# Patient Record
Sex: MALE | Race: WHITE | NOT HISPANIC OR LATINO | Employment: FULL TIME | URBAN - NONMETROPOLITAN AREA
[De-identification: names, ages, dates, MRNs, and addresses within clinical notes are randomized per-mention and may not be internally consistent; named-entity substitution may affect disease eponyms.]

---

## 2017-04-13 ENCOUNTER — HISTORY (OUTPATIENT)
Dept: INTERNAL MEDICINE | Facility: OTHER | Age: 67
End: 2017-04-13

## 2017-04-13 ENCOUNTER — OFFICE VISIT - GICH (OUTPATIENT)
Dept: INTERNAL MEDICINE | Facility: OTHER | Age: 67
End: 2017-04-13

## 2017-04-13 DIAGNOSIS — L21.9 SEBORRHEIC DERMATITIS: ICD-10-CM

## 2017-04-13 ASSESSMENT — PATIENT HEALTH QUESTIONNAIRE - PHQ9: SUM OF ALL RESPONSES TO PHQ QUESTIONS 1-9: 0

## 2017-06-12 ENCOUNTER — COMMUNICATION - GICH (OUTPATIENT)
Dept: INTERNAL MEDICINE | Facility: OTHER | Age: 67
End: 2017-06-12

## 2017-12-13 ENCOUNTER — HISTORY (OUTPATIENT)
Dept: INTERNAL MEDICINE | Facility: OTHER | Age: 67
End: 2017-12-13

## 2017-12-13 ENCOUNTER — OFFICE VISIT - GICH (OUTPATIENT)
Dept: INTERNAL MEDICINE | Facility: OTHER | Age: 67
End: 2017-12-13

## 2017-12-13 DIAGNOSIS — E78.5 HYPERLIPIDEMIA: ICD-10-CM

## 2017-12-13 DIAGNOSIS — I10 ESSENTIAL (PRIMARY) HYPERTENSION: ICD-10-CM

## 2017-12-13 DIAGNOSIS — I25.10 ATHEROSCLEROTIC HEART DISEASE OF NATIVE CORONARY ARTERY WITHOUT ANGINA PECTORIS: ICD-10-CM

## 2017-12-13 DIAGNOSIS — Z79.899 OTHER LONG TERM (CURRENT) DRUG THERAPY: ICD-10-CM

## 2017-12-13 DIAGNOSIS — Z23 ENCOUNTER FOR IMMUNIZATION: ICD-10-CM

## 2017-12-13 DIAGNOSIS — K22.719 BARRETT'S ESOPHAGUS WITH DYSPLASIA: ICD-10-CM

## 2017-12-13 LAB
A/G RATIO - HISTORICAL: 1.1 (ref 1–2)
ALBUMIN SERPL-MCNC: 3.9 G/DL (ref 3.5–5.7)
ALP SERPL-CCNC: 77 IU/L (ref 34–104)
ALT (SGPT) - HISTORICAL: 14 IU/L (ref 7–52)
ANION GAP - HISTORICAL: 9 (ref 5–18)
AST SERPL-CCNC: 24 IU/L (ref 13–39)
BILIRUB SERPL-MCNC: 0.7 MG/DL (ref 0.3–1)
BUN SERPL-MCNC: 22 MG/DL (ref 7–25)
BUN/CREAT RATIO - HISTORICAL: 19
CALCIUM SERPL-MCNC: 8.9 MG/DL (ref 8.6–10.3)
CHLORIDE SERPLBLD-SCNC: 101 MMOL/L (ref 98–107)
CO2 SERPL-SCNC: 28 MMOL/L (ref 21–31)
CREAT SERPL-MCNC: 1.14 MG/DL (ref 0.7–1.3)
GFR IF NOT AFRICAN AMERICAN - HISTORICAL: >60 ML/MIN/1.73M2
GLOBULIN - HISTORICAL: 3.4 G/DL (ref 2–3.7)
GLUCOSE SERPL-MCNC: 81 MG/DL (ref 70–105)
POTASSIUM SERPL-SCNC: 4 MMOL/L (ref 3.5–5.1)
PROT SERPL-MCNC: 7.3 G/DL (ref 6.4–8.9)
SODIUM SERPL-SCNC: 138 MMOL/L (ref 133–143)
VIT B12 SERPL-MCNC: 842 PG/ML (ref 180–914)

## 2017-12-13 ASSESSMENT — ANXIETY QUESTIONNAIRES
5. BEING SO RESTLESS THAT IT IS HARD TO SIT STILL: NOT AT ALL
7. FEELING AFRAID AS IF SOMETHING AWFUL MIGHT HAPPEN: NOT AT ALL
6. BECOMING EASILY ANNOYED OR IRRITABLE: NOT AT ALL
2. NOT BEING ABLE TO STOP OR CONTROL WORRYING: NOT AT ALL
4. TROUBLE RELAXING: NOT AT ALL
1. FEELING NERVOUS, ANXIOUS, OR ON EDGE: NOT AT ALL
3. WORRYING TOO MUCH ABOUT DIFFERENT THINGS: NOT AT ALL
GAD7 TOTAL SCORE: 0

## 2017-12-13 ASSESSMENT — PATIENT HEALTH QUESTIONNAIRE - PHQ9: SUM OF ALL RESPONSES TO PHQ QUESTIONS 1-9: 0

## 2017-12-28 NOTE — TELEPHONE ENCOUNTER
Patient Information     Patient Name MRCedric Robledo [id number] Male 1950      Telephone Encounter by Briana Coles LPN at 2017  8:25 AM     Author:  Briana Coles LPN Service:  (none) Author Type:  NURS- Licensed Practical Nurse     Filed:  2017  8:25 AM Encounter Date:  2017 Status:  Signed     :  Briana Coles LPN (NURS- Licensed Practical Nurse)            Verified patient's last name and date of birth. Notified of the information below.  Briana Coles LPN.........2017   8:25 AM

## 2017-12-28 NOTE — TELEPHONE ENCOUNTER
Patient Information     Patient Name Cedric Zuñiga [id number] Male 1950      Telephone Encounter by Kanika Gu DO at 2017  9:19 AM     Author:  Kanika Gu DO Service:  (none) Author Type:  PHYS- Osteopathic     Filed:  2017  9:20 AM Encounter Date:  2017 Status:  Signed     :  Kanika Gu DO (PHYS- Osteopathic)            [facility]  He should drop off a copy of his lab work when it is complete here so that we can have it on file.  He does not need an additional visit here.

## 2017-12-28 NOTE — TELEPHONE ENCOUNTER
Patient Information     Patient Name Cedric Zuñiga [id number] Male 1950      Telephone Encounter by Marion Pope LPN at 2017 10:04 AM     Author:  Marion Pope LPN Service:  (none) Author Type:  NURS- Licensed Practical Nurse     Filed:  2017 10:05 AM Encounter Date:  2017 Status:  Signed     :  Marion Pope LPN (NURS- Licensed Practical Nurse)            Left a message for the patient to call back.  Marion Pope LPN......2017  10:04 AM

## 2017-12-28 NOTE — TELEPHONE ENCOUNTER
Patient Information     Patient Name MRN Sex Cedric Thompson [id number] Male 1950      Telephone Encounter by Brielle Olivia at 2017  8:59 AM     Author:  Brielle Olivia Service:  (none) Author Type:  (none)     Filed:  2017  9:01 AM Encounter Date:  2017 Status:  Signed     :  Brielle Olivia            Inquired with [facility] if [facility]  He has upcoming appointment there on  to have labs done at that time.  Notified him he would need a lab only appointment due to the high risk medications he is taking and he understood.  Brielle Olivia LPN..................2017  9:00 AM

## 2018-01-04 NOTE — PATIENT INSTRUCTIONS
Patient Information     Patient Name Cedric Zuñiga [id number] Male 1950      Patient Instructions by Kanika Gu DO at 2017  2:50 PM     Author:  Kanika Gu DO Service:  (none) Author Type:  PHYS- Osteopathic     Filed:  2017  3:22 PM Encounter Date:  2017 Status:  Signed     :  Kanika Gu DO (PHYS- Osteopathic)               Index [place]   Dermatitis: Seborrheic   ________________________________________________________________________  KEY POINTS    Seborrheic dermatitis is a skin condition that causes flaking of your skin. Seborrheic dermatitis is a common cause of dandruff.    The treatment depends on where the seborrhea is and how severe it is. Your healthcare provider may prescribe special shampoo, cream, or steroid medicine.    Follow the full course of treatment prescribed by your healthcare provider. Use dandruff shampoo that contains zinc or selenium as recommended.  ________________________________________________________________________  What is seborrheic dermatitis?  Seborrheic dermatitis is a skin condition that causes flaking of your skin. Seborrheic dermatitis is a common cause of dandruff, but dandruff can also be caused by dry skin from stress, weather, diet, hair care products, hormones, and some medical problems.  Seborrheic dermatitis is also called seborrhea.    What is the cause?  The exact cause of seborrhea is not known. Seborrhea is not caused by lack of cleanliness, and it is not an allergy. You have a greater risk of having seborrhea if you have:    HIV    Certain types of fungus or yeast infections    Acne, rosacea, or psoriasis    Parkinson s disease    Epilepsy    Problems with alcohol abuse    Depression    An eating disorder  Some medicines may also increase your risk.    What are the symptoms?  The parts of the body that tend to be affected are the scalp, the forehead, eyelids, ear canals, and the skin on either side of your  nose. It can also affect the upper back, chest, armpits, and groin. Those areas have many oil glands that make skin oil (sebum) that can block pores and trap dead skin cells. Symptoms may include:    Itchy patches of flaky skin    Patches of red, scaly, greasy skin that may have yellow or brown crusts on the top    How is it diagnosed?  Your healthcare provider will ask about your symptoms and medical history and examine your skin. In some cases, your skin might be tested for fungus, or you might have blood tests.    How is it treated?  Seborrhea often comes and goes. Treatment will not cure it but can help loosen scaly and reduce itching and swelling. Using an anti-seborrhea shampoo can help prevent flare-ups.  The treatment depends on where the seborrhea is and how severe it is. Your healthcare provider may prescribe:    Prescription antifungal shampoo. Treating the scalp with shampoo may also help the seborrhea rash on nearby areas of skin, such as the forehead and eyebrows. Do not use scalp shampoo around the eyes.    Antifungal creams to treat yeast infections  If your symptoms are severe, your healthcare provider may prescribe:    Antifungal medicine that you take by mouth    Steroid cream or lotion    How can I take care of myself?  Follow the full course of treatment prescribed by your healthcare provider. In addition:    Use dandruff shampoo that contains zinc or selenium as recommended.    Avoid putting a lot of styling products on your hair. Hair sprays, mousses, and gels may build up on your scalp and may make it oily.    Gently clean your eyelids. Use a mixture of half no-tears shampoo and half water to clean flaky skin on your eyelids. A warm washcloth over your eyes may also help.    Do not use petroleum jelly on the affected areas.  Ask your provider:    How long it will take to recover    If there are activities you should avoid and when you can return to your normal activities    How to take care  of yourself at home    What symptoms or problems you should watch for and what to do if you have them  Make sure you know when you should come back for a checkup. Keep all appointments for provider visits or tests.    Developed by [facility] [facility].  Adult Advisor 2016.3 published by [employer] [facility].  Last modified: 2016-07-19  Last reviewed: 2016-07-19  This content is reviewed periodically and is subject to change as new health information becomes available. The information is intended to inform and educate and is not a replacement for medical evaluation, advice, diagnosis or treatment by a healthcare professional.  References   Adult Advisor 2016.3 Index    Copyright   2016 [facility] [facility], a division of [employer] All rights reserved.

## 2018-01-04 NOTE — PROGRESS NOTES
Patient Information     Patient Name Cedric Zuñiga [id number] Male 1950      Progress Notes by Kanika Gu DO at 2017  2:50 PM     Author:  Kanika Gu DO Service:  (none) Author Type:  PHYS- Osteopathic     Filed:  2017  3:42 PM Encounter Date:  2017 Status:  Signed     :  Kanika Gu DO (PHYS- Osteopathic)            Chief Complaint     Patient presents with       Derm Problem      small itchy bumps to back of neck on hairline        Subjective:   Mr. Colin is a 67 y.o. male  seen for the acute concern today of itching and small bumps on the back of his hairline.  He states that these started over the last month.  He was out in [place] for the last 2 months staying at a vacation rentals home.  He does feel they are significantly itchy.  He is also had some flaking from the area.  He also noticed some small bumps under the skin.  He has not tried anything for this at home.  He has never had anything similar prior.    He  reports that he quit smoking about 37 years ago. His smoking use included Cigarettes. He has a 15.00 pack-year smoking history. He has never used smokeless tobacco.    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 Dr. Zheng      H/O varicose veins      Hyperlipidemia      Hypertension      Impingement syndrome of left shoulder 10/28/2015     MRSA (methicillin resistant staph aureus) culture positive     Culture, right leg, healed without significant problem.      Paroxysmal atrial fibrillation (HC)     ongoing despite s/p multiple ablation with Dr. Ivey      Tendinitis of left rotator cuff 10/28/2015      "Vitamin D deficiency    .      ROS:   Pertinent ROS was performed and was negative, including for fevers, chills. No other concerns, with exception of HPI above.      Objective:    /78  Pulse 56  Temp 97.9  F (36.6  C) (Tympanic)  Resp 16  Ht 1.88 m (6' 2\")  Wt 98.9 kg (218 lb)  BMI 27.99 kg/m2  GEN: Vitals reviewed.  Patient is in no acute distress. Cooperative with exam.  LYMPH: Multiple small lymph nodes noted in the occipital region.  SKIN: Warm and dry to touch.  No rash on face, arms and legs.  Posterior area of the scalp with increased redness, areas of excoriation, flaking.     Assessment/Plan:   1. Seborrheic dermatitis of scalp  - appears to most likely be seborrheic dermatitis.  We will treat with selenium sulfide.  He is also provided hydrocortisone cream in case the shampoo does not help.  He will call if he has further problems and we can consider referral to dermatology.  - selenium sulfide 2.25 %; Apply topically for 5- to 10-minute daily initially; then twice weekly as needed  Dispense: 180 mL; Refill: 0  - hydrocortisone 2.5% cream; Apply 1-2 times daily until itching clears for areas that do not improve with shampoo, then as needed  Dispense: 28 g; Refill: 0      Return if symptoms worsen or fail to improve.    Patient Instructions      Index [place]   Dermatitis: Seborrheic   ________________________________________________________________________  KEY POINTS    Seborrheic dermatitis is a skin condition that causes flaking of your skin. Seborrheic dermatitis is a common cause of dandruff.    The treatment depends on where the seborrhea is and how severe it is. Your healthcare provider may prescribe special shampoo, cream, or steroid medicine.    Follow the full course of treatment prescribed by your healthcare provider. Use dandruff shampoo that contains zinc or selenium as recommended.  ________________________________________________________________________  What is seborrheic " dermatitis?  Seborrheic dermatitis is a skin condition that causes flaking of your skin. Seborrheic dermatitis is a common cause of dandruff, but dandruff can also be caused by dry skin from stress, weather, diet, hair care products, hormones, and some medical problems.  Seborrheic dermatitis is also called seborrhea.    What is the cause?  The exact cause of seborrhea is not known. Seborrhea is not caused by lack of cleanliness, and it is not an allergy. You have a greater risk of having seborrhea if you have:    HIV    Certain types of fungus or yeast infections    Acne, rosacea, or psoriasis    Parkinson s disease    Epilepsy    Problems with alcohol abuse    Depression    An eating disorder  Some medicines may also increase your risk.    What are the symptoms?  The parts of the body that tend to be affected are the scalp, the forehead, eyelids, ear canals, and the skin on either side of your nose. It can also affect the upper back, chest, armpits, and groin. Those areas have many oil glands that make skin oil (sebum) that can block pores and trap dead skin cells. Symptoms may include:    Itchy patches of flaky skin    Patches of red, scaly, greasy skin that may have yellow or brown crusts on the top    How is it diagnosed?  Your healthcare provider will ask about your symptoms and medical history and examine your skin. In some cases, your skin might be tested for fungus, or you might have blood tests.    How is it treated?  Seborrhea often comes and goes. Treatment will not cure it but can help loosen scaly and reduce itching and swelling. Using an anti-seborrhea shampoo can help prevent flare-ups.  The treatment depends on where the seborrhea is and how severe it is. Your healthcare provider may prescribe:    Prescription antifungal shampoo. Treating the scalp with shampoo may also help the seborrhea rash on nearby areas of skin, such as the forehead and eyebrows. Do not use scalp shampoo around the  eyes.    Antifungal creams to treat yeast infections  If your symptoms are severe, your healthcare provider may prescribe:    Antifungal medicine that you take by mouth    Steroid cream or lotion    How can I take care of myself?  Follow the full course of treatment prescribed by your healthcare provider. In addition:    Use dandruff shampoo that contains zinc or selenium as recommended.    Avoid putting a lot of styling products on your hair. Hair sprays, mousses, and gels may build up on your scalp and may make it oily.    Gently clean your eyelids. Use a mixture of half no-tears shampoo and half water to clean flaky skin on your eyelids. A warm washcloth over your eyes may also help.    Do not use petroleum jelly on the affected areas.  Ask your provider:    How long it will take to recover    If there are activities you should avoid and when you can return to your normal activities    How to take care of yourself at home    What symptoms or problems you should watch for and what to do if you have them  Make sure you know when you should come back for a checkup. Keep all appointments for provider visits or tests.    Developed by [employer].  Adult Advisor 2016.3 published by [employer].  Last modified: 2016-07-19  Last reviewed: 2016-07-19  This content is reviewed periodically and is subject to change as new health information becomes available. The information is intended to inform and educate and is not a replacement for medical evaluation, advice, diagnosis or treatment by a healthcare professional.  References   Adult Advisor 2016.3 Index    Copyright   2016 [employer], a division of [employer] All rights reserved.            PERRY ENNIS DO   4/13/2017 3:40 PM    This document was prepared using voice generated softwear. While every attempt was made for accuracy, grammatical errors may exist.

## 2018-01-04 NOTE — NURSING NOTE
Patient Information     Patient Name MRN Sex Cedric Thompson [id number] Male 1950      Nursing Note by Brielle Olivia at 2017  2:50 PM     Author:  Brielle Olivia Service:  (none) Author Type:  (none)     Filed:  2017  3:13 PM Encounter Date:  2017 Status:  Signed     :  Brielle Olivia            Patient presents to clinic experiencing small itchy bumps to back of neck on hairline for past couple months.  Also, he has warts on his right foot.    Brielel Olivia LPN..................2017  3:09 PM

## 2018-01-09 ENCOUNTER — AMBULATORY - GICH (OUTPATIENT)
Dept: FAMILY MEDICINE | Facility: OTHER | Age: 68
End: 2018-01-09

## 2018-01-18 PROBLEM — K21.9 GASTROESOPHAGEAL REFLUX DISEASE: Status: ACTIVE | Noted: 2018-01-18

## 2018-01-18 PROBLEM — I10 HYPERTENSION: Status: ACTIVE | Noted: 2018-01-18

## 2018-01-18 PROBLEM — E78.5 HYPERLIPIDEMIA: Status: ACTIVE | Noted: 2018-01-18

## 2018-01-18 PROBLEM — I48.0 PAROXYSMAL ATRIAL FIBRILLATION (H): Status: ACTIVE | Noted: 2018-01-18

## 2018-01-18 PROBLEM — I25.10 ASCVD (ARTERIOSCLEROTIC CARDIOVASCULAR DISEASE): Status: ACTIVE | Noted: 2018-01-18

## 2018-01-18 PROBLEM — E55.9 VITAMIN D DEFICIENCY: Status: ACTIVE | Noted: 2018-01-18

## 2018-01-18 RX ORDER — CHLORAL HYDRATE 500 MG
CAPSULE ORAL
COMMUNITY
End: 2019-12-17

## 2018-01-18 RX ORDER — ASPIRIN 81 MG/1
TABLET ORAL
COMMUNITY
Start: 2016-07-19 | End: 2022-02-02 | Stop reason: ALTCHOICE

## 2018-01-18 RX ORDER — ATORVASTATIN CALCIUM 80 MG/1
TABLET, FILM COATED ORAL
COMMUNITY
Start: 2017-12-13 | End: 2018-11-30

## 2018-01-18 RX ORDER — LISINOPRIL 40 MG/1
TABLET ORAL
COMMUNITY
Start: 2017-12-13 | End: 2018-11-30

## 2018-01-26 VITALS
RESPIRATION RATE: 16 BRPM | BODY MASS INDEX: 27.98 KG/M2 | HEART RATE: 56 BPM | SYSTOLIC BLOOD PRESSURE: 126 MMHG | WEIGHT: 218 LBS | DIASTOLIC BLOOD PRESSURE: 78 MMHG | HEIGHT: 74 IN | TEMPERATURE: 97.9 F

## 2018-01-28 ASSESSMENT — PATIENT HEALTH QUESTIONNAIRE - PHQ9: SUM OF ALL RESPONSES TO PHQ QUESTIONS 1-9: 0

## 2018-02-09 VITALS
RESPIRATION RATE: 20 BRPM | HEIGHT: 74 IN | DIASTOLIC BLOOD PRESSURE: 74 MMHG | WEIGHT: 209.56 LBS | HEART RATE: 64 BPM | TEMPERATURE: 96.9 F | BODY MASS INDEX: 26.89 KG/M2 | SYSTOLIC BLOOD PRESSURE: 130 MMHG

## 2018-02-11 ASSESSMENT — PATIENT HEALTH QUESTIONNAIRE - PHQ9: SUM OF ALL RESPONSES TO PHQ QUESTIONS 1-9: 0

## 2018-02-11 ASSESSMENT — ANXIETY QUESTIONNAIRES: GAD7 TOTAL SCORE: 0

## 2018-02-12 NOTE — NURSING NOTE
Patient Information     Patient Name MRN Cedric Su [id number] Male 1950      Nursing Note by Brielle Olivia at 2017  1:20 PM     Author:  Brielle Olivia Service:  (none) Author Type:  (none)     Filed:  2017  1:38 PM Encounter Date:  2017 Status:  Signed     :  Brielle Olivia            Patient presents to clinic for medication management, review and discussion.  Brielle Olivia LPN..................2017  1:33 PM

## 2018-02-12 NOTE — NURSING NOTE
Patient Information     Patient Name Cedric Zuñiga [id number] Male 1950      Nursing Note by Edith Eubanks RN at 2018 12:45 PM     Author:  Edith Eubanks RN Service:  (none) Author Type:  NURS- Registered Nurse     Filed:  2018 12:52 PM Encounter Date:  2018 Status:  Signed     :  Edith Eubanks RN (NURS- Registered Nurse)            Pt denies allergies to yeast gelatin neosporin eggs thimerasol or latex or past reactions to vaccinations. Copy of [facility] given.  EDITH EUBANKS RN ....................  2018   12:52 PM

## 2018-02-12 NOTE — PROGRESS NOTES
Patient Information     Patient Name Cedric Zuñiga [id number] Male 1950      Progress Notes by Kanika Gu DO at 2017  1:20 PM     Author:  Kanika Gu DO Service:  (none) Author Type:  PHYS- Osteopathic     Filed:  2017  6:49 AM Encounter Date:  2017 Status:  Signed     :  Kanika Gu DO (PHYS- Osteopathic)            Chief Complaint     Patient presents with       Medication Management      discussion and review       HPI: Mr. Colin is a 67 y.o. male who presents today for annual review of medications.  He overall is feeling good.  He denies any acute issues today.    He does have a history of coronary disease.  He is currently on an aspirin, statin, lisinopril.  He is curious if he can go off of any of his medications.  We did have a long discussion today regarding the importance of these medications with his history of cardiac disease.  All of his questions were answered regarding this.    He does have a history of Longo's esophagus.  He is on omeprazole daily.  He does try to watch his diet overall.  He did have an EGD done in 2015 that diagnosed this.  It was recommended that he undergo a 3 year follow-up at that time.  Old records are reviewed regarding this.    He does have hyperlipidemia.  He is on a statin.  He denies any side effects from this.  He does have lisinopril that he takes daily for his blood pressure.  His blood pressure overall has been well controlled.  He was educated today that his blood pressure is less than 130/80.  He does occasionally monitor this at home.    He is up-to-date on his colonoscopy at this time.  He is due for a flu shot and would like that today.  He is also due for a pneumonia shot.  We did discuss that he can have this done within 2-3 weeks through the nurse injection clinic.    History is discussed and updated on 2017 with patient.  It is current to the best of my knowledge as below.    Past Medical  History:     Diagnosis  Date     AC (acromioclavicular) joint arthritis 10/28/2015     Elevated diaphragm     Also Atelectasis, left side      Gastroesophageal reflux disease     Longo's esophageous noted on EGD 12/2015      H/O pericarditis 2011    Inflammatory, treated with anti-inflammatories, with pericardial effusion. Dr. Zheng      H/O varicose veins      Hyperlipidemia      Hypertension      Impingement syndrome of left shoulder 10/28/2015     MRSA (methicillin resistant staph aureus) culture positive 2008    Culture, right leg, healed without significant problem.      Paroxysmal atrial fibrillation (HC)     ongoing despite s/p multiple ablation with Dr. Ivey      Tendinitis of left rotator cuff 10/28/2015     Vitamin D deficiency         Past Surgical History:      Procedure  Laterality Date     COLONOSCOPY SCREENING  2008    Normal but tortuos. F/U 2018       EP STUDY /ABLATION  2010/2012/2015    For chronic atrial fibrillation, [facility]       ESOPHAGOGASTRODUODENOSCOPY  2015    showed Longo's        HERNIA REPAIR  2010    Left with mesh.           Current Outpatient Prescriptions     Medication  Sig     aspirin (ECOTRIN) 81 mg enteric coated tablet Take 81 mg by mouth.     atorvastatin (LIPITOR) 80 mg tablet Take 1 tablet by mouth once daily.     Cholecalciferol, Vitamin D3, 2,000 unit tablet Take 2,000 Units by mouth.     lisinopril (PRINIVIL; ZESTRIL) 40 mg tablet Take 1 tablet by mouth once daily.     multivitamin with iron-mineral (UNICAP-M) tablet Take  by mouth.     omega-3 fatty acids (FISH OIL) cap Take 2,000 mg by mouth.     omeprazole (PRILOSEC) 20 mg capsule Take 1 capsule by mouth once daily before a meal.     No current facility-administered medications for this visit.      Medications have been reviewed by me and are current to the best of my knowledge and ability.       Allergies      Allergen   Reactions     Flecainide  Other - Describe In Comment Field     States it makes him feel  terrible, slow heart rate       Simvastatin  Nausea Only     Reacts with warfarin         Family History       Problem   Relation Age of Onset     Heart Disease  Mother 89      possible heart disease       Other  Father 95     macular degeneration/kidney removed       Good Health  Sister      Good Health  Brother      Heart Disease  Maternal Uncle      2  heart disease       Cancer-colon  No Family History      Cancer-prostate  No Family History        Family Status     Relation  Status     Mother  at age 89    heart murmur      Father  at age 94    Macular degeneration, RCC      Sister Alive     Brother Alive     Maternal Uncle      No Family History         Social History     Social History        Marital status:       Spouse name: N/A     Number of children:  N/A     Years of education:  N/A     Occupational History        [employer]     self      Social History Main Topics         Smoking status:   Former Smoker     Packs/day:  1.50     Years:  10.00     Types:  Cigarettes     Quit date:  1980     Smokeless tobacco:   Never Used     Alcohol use   No      Comment: rarely      Drug use:   No      Comment: no IV drug use      Sexual activity:   Yes     Other Topics  Concern     Not on file      Social History Narrative     , 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    Exercise, jogs regularly with his dog.  Lives on [address].  Wrote a novel, and book of poems.          ROS  GEN: -fevers/-chills/-night sweats/+wt change - 10 pound intentional decrease in the last year   NEURO: -headaches/-vision changes  EARS: -hearing changes/-tinnitus  NOSE: -drainage/-congestion  MOUTH/THROAT: - sore throat/-dysphagia/-sores  LUNGS: -sob/-cough  CARDIOVASCULAR: -cp/-palpitations  GI: -pain/-diarrhea/-constipation/-bloody stools  : -dysuria/-hematuria  ENDOCRINE: -skin or hair changes  HEMATOLOGIC/LYMPHATIC: -swollen  "nodes/-easy bruising  SKIN: -rashes/-lesions  MSK/RHEUM: -joint pain/-swelling  NEURO: -weakness/-parasthesias  PSYCH: -anhedonia/-depression/-anxiety  SLEEP: -daytime somnolence/-snoring         EXAM:   /74 (Cuff Site: Right Arm, Position: Sitting, Cuff Size: Adult Large)  Pulse 64  Temp 96.9  F (36.1  C) (Tympanic)  Resp 20  Ht 1.88 m (6' 2\")  Wt 95.1 kg (209 lb 9 oz)  BMI 26.91 kg/m2  Estimated body mass index is 26.91 kg/(m^2) as calculated from the following:    Height as of this encounter: 1.88 m (6' 2\").    Weight as of this encounter: 95.1 kg (209 lb 9 oz).      GEN: Vitals reviewed.  Healthy appearing. Patient is in no acute distress. Cooperative with exam.  HEENT: Normocephalic atraumatic.  Normal external eye, conjunctiva, lids, cornea with no scleral icterus or conjunctival erythema. Pupils equally round. Oropharynx with no erythema or exudates. Dentition adequate.  EACs clear bilat, TM gray with normal landmarks.  NECK: Supple; no thyromegaly or masses noted.  No cervical or supraclavicular lymphadenopathy.  CV: Heart regular in rate and rhythm with no murmur.  Radial and posterior tibial pulses palpable.  LUNGS: Lungs clear to auscultation bilaterally.  Chest rise equal bilaterally.  No accessory muscle use.  ABD:  Soft, nontender, and nondistended.  No rebound. Bowel sounds positive.  SKIN: Warm and dry to touch.  No rash on face, arms and legs.  EXT: No clubbing or cyanosis.  No peripheral edema.  NEURO: Alert and oriented to person, place, and time.  Cranial nerves II-XII grossly intact with no focal or lateralizing deficits.  Muscle tone normal.  Gait normal. No tremor. Sensation intact to light touch.  MSK: ROM of upper and lower ext symmetric and full.  PSYCH: Affect appropriate. Speech fluent. Answers questions appropriately and thought process normal.    LABS: 12/13/2017 - Personally ordered/reviewed  Results for orders placed or performed in visit on 12/13/17      COMP METABOLIC " PANEL      Result  Value Ref Range    SODIUM 138 133 - 143 mmol/L    POTASSIUM 4.0 3.5 - 5.1 mmol/L    CHLORIDE 101 98 - 107 mmol/L    CO2,TOTAL 28 21 - 31 mmol/L    ANION GAP 9 5 - 18                    GLUCOSE 81 70 - 105 mg/dL    CALCIUM 8.9 8.6 - 10.3 mg/dL    BUN 22 7 - 25 mg/dL    CREATININE 1.14 0.70 - 1.30 mg/dL    BUN/CREAT RATIO           19                    GFR if African American >60 >60 ml/min/1.73m2    GFR if not African American >60 >60 ml/min/1.73m2    ALBUMIN 3.9 3.5 - 5.7 g/dL    PROTEIN,TOTAL 7.3 6.4 - 8.9 g/dL    GLOBULIN                  3.4 2.0 - 3.7 g/dL    A/G RATIO 1.1 1.0 - 2.0                    BILIRUBIN,TOTAL 0.7 0.3 - 1.0 mg/dL    ALK PHOSPHATASE 77 34 - 104 IU/L    ALT (SGPT) 14 7 - 52 IU/L    AST (SGOT) 24 13 - 39 IU/L   VITAMIN B12      Result  Value Ref Range    VITAMIN B12 842 180 - 914 pg/mL             ASSESSMENT AND PLAN:    1. ASCVD (arteriosclerotic cardiovascular disease)  - on asa, statin, ACEI  -  no symptoms at this time; continue meds as prescribed    2. Longo's esophagus with dysplasia  - Continue medication as prescribed  - Encouraged to avoid caffeine, NSAIDS, chocolate, alcohol, spicy food, red sauce; consider raising the head of bed 10-15 degrees; do not eat within 2-3 hours of bedtime  - Return/call as needed for follow-up should any new symptoms develop, for worsening of current symptoms or if symptoms do not resolve with above plan.    3. Hyperlipidemia, unspecified hyperlipidemia type  - On statin with no clinical evidence or complaint of myalgias or other ADRs  - Discussed importance of exercise and diet.  - atorvastatin (LIPITOR) 80 mg tablet; Take 1 tablet by mouth once daily.  Dispense: 90 tablet; Refill: 4    4. Hypertension  - Blood pressure today of 130/74 is at the goal of <140/90.  - Continue current regimen.  Instructed to check BP at home.  - Cautioned patient to monitor with antibiotics, herbals and any OTC medications  - electrolytes and renal  function done and ok  - lisinopril (PRINIVIL; ZESTRIL) 40 mg tablet; Take 1 tablet by mouth once daily.  Dispense: 90 tablet; Refill: 4    5. Need for influenza vaccination  - FLU VACCINE => 65 YRS HIGH DOSE TRIVALENT IIV3 IM  - [place] ADMIN FLU VACC SEASONAL (MEDICARE)    6. High risk medication use  - COMP METABOLIC PANEL  - VITAMIN B12    7. Need for 23-polyvalent pneumococcal polysaccharide vaccine  - to be given in nurse injection clinic  - PNEUMOCOCCAL VACCINE 23-VALENT => 1 YO IM          Return in about 1 year (around 12/13/2018) for Annual Exam.      PERRY ENNIS,    12/13/2017 6:38 PM    This document was prepared using voice generated softwear. While every attempt was made for accuracy, grammatical errors may exist.

## 2018-05-10 ENCOUNTER — TELEPHONE (OUTPATIENT)
Dept: INTERNAL MEDICINE | Facility: OTHER | Age: 68
End: 2018-05-10

## 2018-05-10 DIAGNOSIS — Z12.11 SCREENING FOR COLON CANCER: Primary | ICD-10-CM

## 2018-05-10 NOTE — TELEPHONE ENCOUNTER
Patient is requesting referral to be seen by Dr. Severson at [facility] in [facility].  Please place order.    Brielle Olivia LPN............5/10/2018 9:19 AM

## 2018-05-17 ENCOUNTER — TELEPHONE (OUTPATIENT)
Dept: INTERNAL MEDICINE | Facility: OTHER | Age: 68
End: 2018-05-17

## 2018-05-17 NOTE — TELEPHONE ENCOUNTER
Huma says he was told by us he only needed a Referral for his colonoscopy. Now Joce wants him to have a pre-op prior to his procedure scheduled for May 22. He is very upset. Please call. Thank You.  Zoraida Martinez

## 2018-05-17 NOTE — TELEPHONE ENCOUNTER
Left message for Candice - Supervisor to call back so patient can by scheduled in an unavailable spot on Monday 05/21/18 at 11:40 per protocol.    Brielle Olivia LPN............5/17/2018 2:30 PM

## 2018-05-18 NOTE — TELEPHONE ENCOUNTER
Patient will be seen on Monday 05/21/18 at 11:40 for pre op exam by Kanika Gu DO prior to Colonoscopy in [place].    Brielle Olivia LPN............5/18/2018 8:52 AM

## 2018-05-21 ENCOUNTER — OFFICE VISIT (OUTPATIENT)
Dept: INTERNAL MEDICINE | Facility: OTHER | Age: 68
End: 2018-05-21
Attending: INTERNAL MEDICINE
Payer: COMMERCIAL

## 2018-05-21 VITALS
HEIGHT: 74 IN | WEIGHT: 210.5 LBS | RESPIRATION RATE: 18 BRPM | DIASTOLIC BLOOD PRESSURE: 74 MMHG | TEMPERATURE: 97.2 F | BODY MASS INDEX: 27.01 KG/M2 | SYSTOLIC BLOOD PRESSURE: 114 MMHG | HEART RATE: 56 BPM | OXYGEN SATURATION: 96 %

## 2018-05-21 DIAGNOSIS — K21.9 GASTROESOPHAGEAL REFLUX DISEASE, ESOPHAGITIS PRESENCE NOT SPECIFIED: ICD-10-CM

## 2018-05-21 DIAGNOSIS — Z01.818 PREOP GENERAL PHYSICAL EXAM: Primary | ICD-10-CM

## 2018-05-21 DIAGNOSIS — I48.0 PAROXYSMAL ATRIAL FIBRILLATION (H): ICD-10-CM

## 2018-05-21 DIAGNOSIS — I10 ESSENTIAL HYPERTENSION: ICD-10-CM

## 2018-05-21 PROBLEM — E55.9 VITAMIN D DEFICIENCY: Status: RESOLVED | Noted: 2018-01-18 | Resolved: 2018-05-21

## 2018-05-21 PROBLEM — I25.10 ASCVD (ARTERIOSCLEROTIC CARDIOVASCULAR DISEASE): Status: RESOLVED | Noted: 2018-01-18 | Resolved: 2018-05-21

## 2018-05-21 PROCEDURE — 99214 OFFICE O/P EST MOD 30 MIN: CPT | Performed by: INTERNAL MEDICINE

## 2018-05-21 PROCEDURE — G0463 HOSPITAL OUTPT CLINIC VISIT: HCPCS

## 2018-05-21 ASSESSMENT — ANXIETY QUESTIONNAIRES
IF YOU CHECKED OFF ANY PROBLEMS ON THIS QUESTIONNAIRE, HOW DIFFICULT HAVE THESE PROBLEMS MADE IT FOR YOU TO DO YOUR WORK, TAKE CARE OF THINGS AT HOME, OR GET ALONG WITH OTHER PEOPLE: NOT DIFFICULT AT ALL
3. WORRYING TOO MUCH ABOUT DIFFERENT THINGS: NOT AT ALL
2. NOT BEING ABLE TO STOP OR CONTROL WORRYING: NOT AT ALL
5. BEING SO RESTLESS THAT IT IS HARD TO SIT STILL: NOT AT ALL
7. FEELING AFRAID AS IF SOMETHING AWFUL MIGHT HAPPEN: NOT AT ALL
GAD7 TOTAL SCORE: 0
1. FEELING NERVOUS, ANXIOUS, OR ON EDGE: NOT AT ALL
6. BECOMING EASILY ANNOYED OR IRRITABLE: NOT AT ALL

## 2018-05-21 ASSESSMENT — PATIENT HEALTH QUESTIONNAIRE - PHQ9: 5. POOR APPETITE OR OVEREATING: NOT AT ALL

## 2018-05-21 ASSESSMENT — PAIN SCALES - GENERAL: PAINLEVEL: NO PAIN (0)

## 2018-05-21 NOTE — PROGRESS NOTES
[facility] [place]  [address]  [place] [address]  [phone]    PRE-OP EVALUATION:  Today's date: 2018    Cedric Colin (: 1950) presents for pre-operative evaluation assessment as requested by Dr. Severson.  He requires evaluation and anesthesia risk assessment prior to undergoing surgery/procedure for treatment of Colonoscopy.    Proposed Surgery/ Procedure: Colonoscopy  Date of Surgery/ Procedure: 18  [facility] of Surgery/ Procedure: [facility]  Fax number for surgical facility: unknown  Primary Physician: Kanika Gu  Type of Anesthesia Anticipated: to be determined    Patient has a Health Care Directive or Living Will:  YES on file    1. NO - Do you have a history of heart attack, stroke, stent, bypass or surgery on an artery in the head, neck, heart or legs?  2. NO - Do you ever have any pain or discomfort in your chest?  3. NO - Do you have a history of  Heart Failure?  4. NO - Are you troubled by shortness of breath when: walking on the level, up a slight hill or at night?  5. NO - Do you currently have a cold, bronchitis or other respiratory infection?  6. NO - Do you have a cough, shortness of breath or wheezing?  7. NO - Do you sometimes get pains in the calves of your legs when you walk?  8. NO - Do you or anyone in your family have previous history of blood clots?  9. NO - Do you or does anyone in your family have a serious bleeding problem such as prolonged bleeding following surgeries or cuts?  10. NO - Have you ever had problems with anemia or been told to take iron pills?  11. NO - Have you had any abnormal blood loss such as black, tarry or bloody stools, or abnormal vaginal bleeding?  12. NO - Have you ever had a blood transfusion?  13. NO - Have you or any of your relatives ever had problems with anesthesia?  14. NO - Do you have sleep apnea, excessive snoring or daytime drowsiness?  15. NO -  Do you have any prosthetic heart valves?  16. NO - Do you have prosthetic joints?  17. NO - Is there any chance that you may be pregnant?      HPI:     HPI related to upcoming procedure: Patient overall has been doing well.  He has not had any new symptoms.  He denies any concerns at this time.      A-FIB - Patient has a longstanding history of paroxysmal atrial fibrillation.  He does not require rate control as he has not had any episodes of RVR in years.  He denies any episodes of irregular rhythm.  Current treatment regimen includes Aspirin for stroke prevention and denies significant symptoms of lightheadedness, palpitations or dyspnea.                                                                                                                                                                             .  HYPERTENSION - Patient has longstanding history of HTN , currently denies any symptoms referable to elevated blood pressure. Specifically denies chest pain, palpitations, dyspnea, orthopnea, PND or peripheral edema. Blood pressure readings have been in normal range. Current medication regimen is as listed below. Patient denies any side effects of medication.                                                                                                                                                                                          .    MEDICAL HISTORY:     Patient Active Problem List    Diagnosis Date Noted     Gastroesophageal reflux disease 01/18/2018     Priority: Medium     Hyperlipidemia 01/18/2018     Priority: Medium     Hypertension 01/18/2018     Priority: Medium     Paroxysmal atrial fibrillation (H) 01/18/2018     Priority: Medium     Overview:   ongoing despite s/p multiple ablation with Dr. Ivey        Past Medical History:   Diagnosis Date     AC (acromioclavicular) joint arthritis 10/28/2015     Carrier or suspected carrier of Methicillin resistant Staphylococcus aureus (CODE)  2008    Culture, right leg, healed without significant problem.     Disorder of diaphragm     Also Atelectasis, left side     Essential (primary) hypertension      Gastro-esophageal reflux disease without esophagitis     Longo's esophageous noted on EGD 12/2015     History of varicose veins      Hyperlipidemia      Impingement syndrome of left shoulder 10/28/2015     Paroxysmal atrial fibrillation (H)     ongoing despite s/p multiple ablation with Dr. Ivey     Pericarditis 2011    Inflammatory, treated with anti-inflammatories, with pericardial effusion. Dr. Zheng     Primary osteoarthritis of shoulder     10/28/2015     Vitamin D deficiency      Past Surgical History:   Procedure Laterality Date     COLONOSCOPY      2008,Normal but tortuos. F/U 2018     HEART CATH SEPTAL ABLATION      2010/2012/2015 For chronic atrial fibrillation, [facility]     HERNIA REPAIR Left 2010    Left with mesh.     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     Current Outpatient Prescriptions   Medication Sig Dispense Refill     aspirin EC 81 MG EC tablet Take 81 mg by mouth.       atorvastatin (LIPITOR) 80 MG tablet Take 1 tablet by mouth once daily.       cholecalciferol (D 2000) 2000 UNITS tablet Take 2,000 Units by mouth.       lisinopril (PRINIVIL/ZESTRIL) 40 MG tablet Take 1 tablet by mouth once daily.       Multiple Vitamins-Minerals (UNICAP-M PO) Take  by mouth.       Omega-3 1000 MG CAPS Take 2,000 mg by mouth.       omeprazole (PRILOSEC) 20 MG CR capsule Take 1 capsule by mouth once daily before a meal.       OTC products: None, except as noted above    Allergies   Allergen Reactions     Flecainide Other (See Comments)     States it makes him feel terrible, slow heart rate      Simvastatin Nausea     Reacts with warfarin        Latex Allergy: NO    Social History   Substance Use Topics     Smoking status: Former Smoker     Packs/day: 1.50     Years: 10.00     Types: Cigarettes     Quit date: 1/1/1980     Smokeless tobacco:  "Never Used     Alcohol use No      Comment: Alcoholic Drinks/day: rarely     History   Drug Use No     Comment: No IV drug use       REVIEW OF SYSTEMS:   Constitutional, neuro, ENT, endocrine, pulmonary, cardiac, gastrointestinal, genitourinary, musculoskeletal, integument and psychiatric systems are negative, except as otherwise noted.    EXAM:   /74 (BP Location: Right arm, Patient Position: Sitting, Cuff Size: Adult Large)  Pulse 56  Temp 97.2  F (36.2  C) (Tympanic)  Resp 18  Ht 6' 2\" (1.88 m)  Wt 210 lb 8 oz (95.5 kg)  SpO2 96%  BMI 27.03 kg/m2    GENERAL APPEARANCE: healthy, alert and no distress     EYES: EOMI,  PERRL     HENT: nose and mouth without ulcers or lesions     NECK: no adenopathy, no asymmetry, masses, or scars and thyroid normal to palpation     RESP: lungs clear to auscultation - no rales, rhonchi or wheezes     CV: regular rates and rhythm, normal S1 S2, no S3 or S4 and no murmur, click or rub     ABDOMEN:  soft, nontender, no HSM or masses and bowel sounds normal     MS: extremities normal- no gross deformities noted, no evidence of inflammation in joints, FROM in all extremities.     SKIN: no suspicious lesions or rashes     NEURO: Normal strength and tone, sensory exam grossly normal, mentation intact and speech normal     PSYCH: mentation appears normal. and affect normal/bright     LYMPHATICS: No cervical adenopathy    DIAGNOSTICS:   Labs done in the past year were all normal.  Given stability of all of his labs and medications along with low risk of his procedure no further testing is needed at this time.    Recent Labs   Lab Test  12/13/17   1455  12/09/16   1523  12/04/15   1530   04/25/15   1451  04/25/15   1433   12/30/12   1946   HGB   --    --   14.1   --    --   14.5   < >   --    PLT   --    --   180   --    --   189   < >   --    INR   --    --    --    --   1.3*   --    --   1.1   NA  138  133   --    < >   --    --    < >   --    POTASSIUM  4.0  4.0   --    < >   " --    --    < >   --    CR  1.14  1.14   --    < >   --    --    < >   --     < > = values in this interval not displayed.        IMPRESSION:   Reason for surgery/procedure: Colonoscopy  Diagnosis/reason for consult: Multiple comorbidities    The proposed surgical procedure is considered LOW risk.    REVISED CARDIAC RISK INDEX  The patient has the following serious cardiovascular risks for perioperative complications such as (MI, PE, VFib and 3  AV Block):    No serious cardiac risks    INTERPRETATION: 0 risks: Class I (very low risk - 0.4% complication rate)    The patient has the following additional risks for perioperative complications:    No identified additional risks      ICD-10-CM    1. Preop general physical exam Z01.818    2. Gastroesophageal reflux disease, esophagitis presence not specified K21.9    3. Paroxysmal atrial fibrillation (H) I48.0    4. Essential hypertension I10        RECOMMENDATIONS:       --Patient is to take all scheduled medications on the day of surgery EXCEPT for aspirin which he was instructed to hold    APPROVAL GIVEN to proceed with proposed procedure, without further diagnostic evaluation       Signed Electronically by: Kanika Gu DO    Copy of this evaluation report is provided to requesting physician.    [facility] Preop Guidelines    Revised Cardiac Risk Index

## 2018-05-21 NOTE — MR AVS SNAPSHOT
After Visit Summary   5/21/2018    Cedric Colin    MRN: [id number]           Patient Information     Date Of Birth          1950        Visit Information        Provider Department      5/21/2018 11:40 AM Kanika Gu DO [facility]        Today's Diagnoses     Preop general physical exam    -  1      Care Instructions      Before Your Surgery      Call your surgeon if there is any change in your health. This includes signs of a cold or flu (such as a sore throat, runny nose, cough, rash or fever).    Do not smoke, drink alcohol or take over the counter medicine (unless your surgeon or primary care doctor tells you to) for the 24 hours before and after surgery.    If you take prescribed drugs: Follow your doctor s orders about which medicines to take and which to stop until after surgery.    Eating and drinking prior to surgery: follow the instructions from your surgeon    Take a shower or bath the night before surgery. Use the soap your surgeon gave you to gently clean your skin. If you do not have soap from your surgeon, use your regular soap. Do not shave or scrub the surgery site.  Wear clean pajamas and have clean sheets on your bed.           Follow-ups after your visit        Your next 10 appointments already scheduled     Dec 03, 2018  1:40 PM CST   PHYSICAL with Kanika Gu DO   [facility] ([facility])    [address]  [facility] [id number] [phone]              Who to contact     If you have questions or need follow up information about today's clinic visit or your schedule please contact [facility] directly at [phone].  Normal or non-critical lab and imaging results will be communicated to you by MyChart, letter or phone within 4 business days after the clinic has received the results. If you do not hear from us within 7 days, please contact the clinic through [employer] or  "phone. If you have a critical or abnormal lab result, we will notify you by phone as soon as possible.  Submit refill requests through [employer] or call your pharmacy and they will forward the refill request to us. Please allow 3 business days for your refill to be completed.          Additional Information About Your Visit        MyChart Information     [employer] gives you secure access to your electronic health record. If you see a primary care provider, you can also send messages to your care team and make appointments. If you have questions, please call your primary care clinic.  If you do not have a primary care provider, please call [phone] and they will assist you.        Care EveryWhere ID     This is your Care EveryWhere ID. This could be used by other organizations to access your [facility] medical records  [id number]        Your Vitals Were     Pulse Temperature Respirations Height Pulse Oximetry BMI (Body Mass Index)    56 97.2  F (36.2  C) (Tympanic) 18 6' 2\" (1.88 m) 96% 27.03 kg/m2       Blood Pressure from Last 3 Encounters:   05/21/18 114/74   12/13/17 130/74   04/13/17 126/78    Weight from Last 3 Encounters:   05/21/18 210 lb 8 oz (95.5 kg)   12/13/17 209 lb 9 oz (95.1 kg)   04/13/17 218 lb (98.9 kg)              Today, you had the following     No orders found for display       Primary Care Provider Office Phone # Fax #    Kanika Gu [phone]       [address] [facility] [address]        Equal Access to Services     [facility] : Hadii aad ku hadasho Soomaali, waaxda luqadaha, qaybta kaalmada adeegyada, sean nieves . So [facility] [phone].    ATENCIÓN: Si habla español, tiene a fry disposición servicios gratuitos de asistencia lingüística. Llame al [phone].    We comply with applicable federal civil rights laws and [place] laws. We do not discriminate on the basis of race, color, national origin, age, disability, sex, sexual " orientation, or gender identity.            Thank you!     Thank you for choosing [facility] AND [facility]  for your care. Our goal is always to provide you with excellent care. Hearing back from our patients is one way we can continue to improve our services. Please take a few minutes to complete the written survey that you may receive in the mail after your visit with us. Thank you!             Your Updated Medication List - Protect others around you: Learn how to safely use, store and throw away your medicines at www.disposemymeds.org.          This list is accurate as of 5/21/18 12:22 PM.  Always use your most recent med list.                   Brand Name Dispense Instructions for use Diagnosis    aspirin 81 MG EC tablet      Take 81 mg by mouth.        atorvastatin 80 MG tablet    LIPITOR     Take 1 tablet by mouth once daily.        D 2000 2000 units tablet   Generic drug:  cholecalciferol      Take 2,000 Units by mouth.        lisinopril 40 MG tablet    PRINIVIL/ZESTRIL     Take 1 tablet by mouth once daily.        Omega-3 1000 MG Caps      Take 2,000 mg by mouth.        omeprazole 20 MG CR capsule    priLOSEC     Take 1 capsule by mouth once daily before a meal.        UNICAP-M PO      Take  by mouth.

## 2018-05-21 NOTE — NURSING NOTE
Patient presents to clinic for pre op exam.  Surgery with Dr. Severson at [facility] in [facility] 05/22/18 for colonoscopy.    Brielle Olivia LPN............5/21/2018 11:48 AM

## 2018-05-22 ENCOUNTER — TRANSFERRED RECORDS (OUTPATIENT)
Dept: HEALTH INFORMATION MANAGEMENT | Facility: OTHER | Age: 68
End: 2018-05-22

## 2018-05-22 ASSESSMENT — PATIENT HEALTH QUESTIONNAIRE - PHQ9: SUM OF ALL RESPONSES TO PHQ QUESTIONS 1-9: 0

## 2018-05-22 ASSESSMENT — ANXIETY QUESTIONNAIRES: GAD7 TOTAL SCORE: 0

## 2018-11-30 ENCOUNTER — OFFICE VISIT (OUTPATIENT)
Dept: INTERNAL MEDICINE | Facility: OTHER | Age: 68
End: 2018-11-30
Attending: INTERNAL MEDICINE
Payer: MEDICARE

## 2018-11-30 VITALS
DIASTOLIC BLOOD PRESSURE: 96 MMHG | SYSTOLIC BLOOD PRESSURE: 150 MMHG | BODY MASS INDEX: 27.75 KG/M2 | HEART RATE: 64 BPM | RESPIRATION RATE: 18 BRPM | TEMPERATURE: 97.5 F | HEIGHT: 74 IN | WEIGHT: 216.25 LBS

## 2018-11-30 DIAGNOSIS — K21.9 GASTROESOPHAGEAL REFLUX DISEASE, ESOPHAGITIS PRESENCE NOT SPECIFIED: Primary | ICD-10-CM

## 2018-11-30 DIAGNOSIS — I10 ESSENTIAL HYPERTENSION: ICD-10-CM

## 2018-11-30 DIAGNOSIS — Z23 NEED FOR PROPHYLACTIC VACCINATION AND INOCULATION AGAINST INFLUENZA: ICD-10-CM

## 2018-11-30 DIAGNOSIS — Z79.899 HIGH RISK MEDICATION USE: ICD-10-CM

## 2018-11-30 DIAGNOSIS — Z11.59 ENCOUNTER FOR HCV SCREENING TEST FOR LOW RISK PATIENT: ICD-10-CM

## 2018-11-30 DIAGNOSIS — E78.5 HYPERLIPIDEMIA, UNSPECIFIED HYPERLIPIDEMIA TYPE: ICD-10-CM

## 2018-11-30 DIAGNOSIS — D72.819 LEUKOPENIA, UNSPECIFIED TYPE: ICD-10-CM

## 2018-11-30 LAB
ALBUMIN SERPL-MCNC: 4.3 G/DL (ref 3.5–5.7)
ALP SERPL-CCNC: 83 U/L (ref 34–104)
ALT SERPL W P-5'-P-CCNC: 20 U/L (ref 7–52)
ANION GAP SERPL CALCULATED.3IONS-SCNC: 3 MMOL/L (ref 3–14)
AST SERPL W P-5'-P-CCNC: 31 U/L (ref 13–39)
BILIRUB SERPL-MCNC: 1.1 MG/DL (ref 0.3–1)
BUN SERPL-MCNC: 20 MG/DL (ref 7–25)
CALCIUM SERPL-MCNC: 9.6 MG/DL (ref 8.6–10.3)
CHLORIDE SERPL-SCNC: 100 MMOL/L (ref 98–107)
CHOLEST SERPL-MCNC: 127 MG/DL
CO2 SERPL-SCNC: 31 MMOL/L (ref 21–31)
CREAT SERPL-MCNC: 1.15 MG/DL (ref 0.7–1.3)
ERYTHROCYTE [DISTWIDTH] IN BLOOD BY AUTOMATED COUNT: 12.1 % (ref 10–15)
GFR SERPL CREATININE-BSD FRML MDRD: 63 ML/MIN/1.7M2
GLUCOSE SERPL-MCNC: 92 MG/DL (ref 70–105)
HCT VFR BLD AUTO: 41.2 % (ref 40–53)
HDLC SERPL-MCNC: 44 MG/DL (ref 23–92)
HGB BLD-MCNC: 14.1 G/DL (ref 13.3–17.7)
LDLC SERPL CALC-MCNC: 65 MG/DL
MAGNESIUM SERPL-MCNC: 1.9 MG/DL (ref 1.9–2.7)
MCH RBC QN AUTO: 29.6 PG (ref 26.5–33)
MCHC RBC AUTO-ENTMCNC: 34.2 G/DL (ref 31.5–36.5)
MCV RBC AUTO: 87 FL (ref 78–100)
NONHDLC SERPL-MCNC: 83 MG/DL
PLATELET # BLD AUTO: 153 10E9/L (ref 150–450)
POTASSIUM SERPL-SCNC: 4 MMOL/L (ref 3.5–5.1)
PROT SERPL-MCNC: 7.6 G/DL (ref 6.4–8.9)
RBC # BLD AUTO: 4.76 10E12/L (ref 4.4–5.9)
SODIUM SERPL-SCNC: 134 MMOL/L (ref 134–144)
TRIGL SERPL-MCNC: 90 MG/DL
VIT B12 SERPL-MCNC: 818 PG/ML (ref 180–914)
WBC # BLD AUTO: 3.5 10E9/L (ref 4–11)

## 2018-11-30 PROCEDURE — 82607 VITAMIN B-12: CPT | Performed by: INTERNAL MEDICINE

## 2018-11-30 PROCEDURE — G0463 HOSPITAL OUTPT CLINIC VISIT: HCPCS

## 2018-11-30 PROCEDURE — 85027 COMPLETE CBC AUTOMATED: CPT | Performed by: INTERNAL MEDICINE

## 2018-11-30 PROCEDURE — G0472 HEP C SCREEN HIGH RISK/OTHER: HCPCS | Performed by: INTERNAL MEDICINE

## 2018-11-30 PROCEDURE — 86803 HEPATITIS C AB TEST: CPT | Performed by: INTERNAL MEDICINE

## 2018-11-30 PROCEDURE — 99214 OFFICE O/P EST MOD 30 MIN: CPT | Performed by: INTERNAL MEDICINE

## 2018-11-30 PROCEDURE — 80061 LIPID PANEL: CPT | Performed by: INTERNAL MEDICINE

## 2018-11-30 PROCEDURE — 90662 IIV NO PRSV INCREASED AG IM: CPT | Performed by: INTERNAL MEDICINE

## 2018-11-30 PROCEDURE — 83735 ASSAY OF MAGNESIUM: CPT | Performed by: INTERNAL MEDICINE

## 2018-11-30 PROCEDURE — G0008 ADMIN INFLUENZA VIRUS VAC: HCPCS | Performed by: INTERNAL MEDICINE

## 2018-11-30 PROCEDURE — G0463 HOSPITAL OUTPT CLINIC VISIT: HCPCS | Mod: 25

## 2018-11-30 PROCEDURE — 36415 COLL VENOUS BLD VENIPUNCTURE: CPT | Performed by: INTERNAL MEDICINE

## 2018-11-30 PROCEDURE — 80053 COMPREHEN METABOLIC PANEL: CPT | Performed by: INTERNAL MEDICINE

## 2018-11-30 RX ORDER — LISINOPRIL 40 MG/1
40 TABLET ORAL DAILY
Qty: 90 TABLET | Refills: 4 | Status: SHIPPED | OUTPATIENT
Start: 2018-11-30 | End: 2020-12-03

## 2018-11-30 RX ORDER — ATORVASTATIN CALCIUM 80 MG/1
80 TABLET, FILM COATED ORAL DAILY
Qty: 90 TABLET | Refills: 4 | Status: SHIPPED | OUTPATIENT
Start: 2018-11-30 | End: 2020-12-03

## 2018-11-30 ASSESSMENT — PAIN SCALES - GENERAL: PAINLEVEL: NO PAIN (0)

## 2018-11-30 NOTE — PROGRESS NOTES

## 2018-11-30 NOTE — NURSING NOTE
Patient presents to clinic for medication management, discussion and refills.    Medication Reconciliation: complete    Brielle Olivia LPN

## 2018-11-30 NOTE — PROGRESS NOTES
"Chief Complaint   Patient presents with     Medication Follow-up     medication management, discussion and refills         HPI: Mr. Colin is a 68 year old male who presents today for annual review of his medication.  He overall is feeling good.  He denies any concerns.  His only issue has been dealing with his wife's hip pain.  He is curious whether she should go through with surgery or not.    He has a history of gastroesophageal reflux disease with Longo's.  He is due for repeat EGD.  He does continue on omeprazole on a daily basis.  He denies any side effects.  He has not had his magnesium or vitamin B12 checked in a prolonged period of time.    He is on a statin for his hyperlipidemia.  He denies any side effects including muscle aches.  He states he overall feels good.  His blood pressure has been controlled overall.  He is on lisinopril 40 mg daily.  He has not been checking it on a regular basis at home but does occasionally.    He is due for flu vaccine.  He is due for hep C screening.  He is up-to-date on his colonoscopy.    History is discussed and updated on 11/30/2018 with patient.  It is current to the best of my knowledge as below.    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     History of varicose veins      Hyperlipidemia      Impingement syndrome of left shoulder 10/28/2015     Paroxysmal atrial fibrillation (H)     ongoing despite s/p multiple ablation with Dr. Ivey     Pericarditis 2011    Inflammatory, treated with anti-inflammatories, with pericardial effusion. Dr. Zheng     Primary osteoarthritis of shoulder 10/28/2015     Vitamin D " deficiency         Past Surgical History:   Procedure Laterality Date     COLONOSCOPY  2018    polyp - return 5 years     HEART CATH SEPTAL ABLATION       For chronic atrial fibrillation, [facility]     HERNIA REPAIR Left 2010    Left with mesh.     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         Current Outpatient Prescriptions   Medication Sig Dispense Refill     aspirin EC 81 MG EC tablet Take 81 mg by mouth.       atorvastatin (LIPITOR) 80 MG tablet Take 1 tablet (80 mg) by mouth daily 90 tablet 4     cholecalciferol (D 2000) 2000 UNITS tablet Take 2,000 Units by mouth.       lisinopril (PRINIVIL/ZESTRIL) 40 MG tablet Take 1 tablet (40 mg) by mouth daily 90 tablet 4     Multiple Vitamins-Minerals (UNICAP-M PO) Take  by mouth.       Omega-3 1000 MG CAPS Take 2,000 mg by mouth.       omeprazole (PRILOSEC) 20 MG DR capsule Take 1 capsule (20 mg) by mouth daily 90 capsule 4     [DISCONTINUED] atorvastatin (LIPITOR) 80 MG tablet Take 1 tablet by mouth once daily.       [DISCONTINUED] lisinopril (PRINIVIL/ZESTRIL) 40 MG tablet Take 1 tablet by mouth once daily.         Allergies   Allergen Reactions     Flecainide Other (See Comments)     States it makes him feel terrible, slow heart rate      Simvastatin Nausea     Reacts with warfarin          Family History   Problem Relation Age of Onset     Heart Disease Mother 89     Heart Disease, possible heart disease     Other - See Comments Father 95     macular degeneration/kidney removed     No Known Problems Sister      No Known Problems Brother      Heart Disease Maternal Uncle      Heart Disease,2  heart disease     Colon Cancer No family hx of      Cancer-colon     Prostate Cancer No family hx of      Cancer-prostate       Family Status   Relation Status     Mother  at age 89    heart murmur     Father  at age 94    Macular degeneration, RCC     Sister Alive     Brother Alive     Maternal Uncle      No family hx of   "       Social History     Social History     Marital status:      Spouse name: N/A     Number of children: N/A     Years of education: N/A     Occupational History      Other     Social History Main Topics     Smoking status: Former Smoker     Packs/day: 1.50     Years: 10.00     Types: Cigarettes     Quit date: 1/1/1980     Smokeless tobacco: Never Used     Alcohol use No      Comment: Alcoholic Drinks/day: rarely     Drug use: No      Comment: No IV drug use     Sexual activity: Yes     Partners: Female     Other Topics Concern     None     Social History Narrative    , 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  Exercise, jogs regularly with his dog.  Lives on [facility].  Wrote a novel, and book of poems.              ROS  GEN: -fevers/-chills/-night sweats/-wt change  NEURO: -headaches/-vision changes  EARS: -hearing changes/-tinnitus  NOSE: -drainage/-congestion  MOUTH/THROAT: - sore throat/-dysphagia/-sores  LUNGS: -sob/-cough  CARDIOVASCULAR: -cp/-palpitations  GI: -pain/-diarrhea/-constipation/-bloody stools  :-dysuria/-hematuria  ENDOCRINE: -skin or hair changes  HEMATOLOGIC/LYMPHATIC: -swollen nodes  SKIN: -rashes/-lesions  MSK/RHEUM: + Occasional joint pain/-swelling  NEURO: -weakness/-parasthesias  PSYCH: -anhedonia/-depression/-anxiety       EXAM:   BP (!) 134/94 (BP Location: Right arm, Patient Position: Sitting, Cuff Size: Adult Regular)  Pulse 64  Temp 97.5  F (36.4  C) (Tympanic)  Resp 18  Ht 6' 2\" (1.88 m)  Wt 216 lb 4 oz (98.1 kg)  BMI 27.76 kg/m2  Estimated body mass index is 27.76 kg/(m^2) as calculated from the following:    Height as of this encounter: 6' 2\" (1.88 m).    Weight as of this encounter: 216 lb 4 oz (98.1 kg).      GEN: Vitals reviewed. Healthy appearing. Patient is in no acute distress. Cooperative with exam.  HEENT: Normocephalic atraumatic.  Normal external eye, conjunctiva, lids, cornea with no " scleral icterus or conjunctival erythema. Pupils equally round. Oropharynx with no erythema or exudates. Dentition adequate.  EACs clear on the left, TM gray with normal landmarks laterally.  Minimal cerumen is noted on the right.  NECK: Supple; no thyromegaly or masses noted.  No cervical or supraclavicular lymphadenopathy.  CV: Heart regular in rate and rhythm with no murmur.    LUNGS: Lungs clear to auscultation bilaterally.  Chest rise equal bilaterally.  No accessory muscle use.  ABD:  Soft, nontender, and nondistended.  No rebound. Bowel sounds positive.  SKIN: Warm and dry to touch.  No rash on face, arms and legs.  EXT: No clubbing or cyanosis.  No peripheral edema.  NEURO: Alert and oriented to person, place, and time.  Cranial nerves II-XII grossly intact with no focal or lateralizing deficits.  Muscle tone normal.  Gait normal. No tremor. Sensation intact to light touch.  MSK: ROM of upper and lower ext symmetric and full.  PSYCH:Affect appropriate. Speech fluent. Answers questions appropriately and thought process normal.       ASSESSMENT AND PLAN:    Gastroesophageal reflux disease, esophagitis presence not specified  - Continue medication as prescribed  - Discussed the need to taper off of PPI when able and discussed alternatives including Tums and H2 blocker  - Encouraged to avoid caffeine,NSAIDS, chocolate, alcohol, spicy food, red sauce; consider raising the head of bed 10-15 degrees; do not eat within 2-3 hours of bedtime  - Return/call as needed for follow-up should any new symptoms develop, forworsening of current symptoms or if symptoms do not resolve with above plan.  - He will call his surgeon who has done this in the past and pursue testing with him.  He is to call if he needs any preop clearance or referrals placed.  - omeprazole (PRILOSEC) 20 MG DR capsule  Dispense: 90 capsule; Refill: 4  - Magnesium  - Vitamin B12    Hyperlipidemia, unspecified hyperlipidemia type  - lipids checked and  pending  - On statin with no clinical evidence orcomplaint of myalgias or other ADRs  - Discussed importance of exercise and diet.  - atorvastatin (LIPITOR) 80 MG tablet  Dispense: 90 tablet; Refill: 4  - Lipid panel  - Lipid panel    Essential hypertension  - Blood pressure today of132/94 is borderline at the goal of <140/90 with minimal exacerbation.  - Continue current regimen at this time.  Instructed to check BP at home.  - Cautioned patient to monitor with antibiotics, herbals and any OTC medications  - electrolytes and renal function done and pending  - lisinopril (PRINIVIL/ZESTRIL) 40 MG tablet  Dispense: 90 tablet; Refill: 4  - Comprehensive Metabolic Panel  - Comprehensive Metabolic Panel    Need for prophylactic vaccination and inoculation against influenza  - HC FLU VACCINE, INCREASED ANTIGEN, PRESV FREE    Encounter for HCV screening test for low risk patient  - Hepatitis C antibody    High risk medication use  - Magnesium  - Vitamin B12  - Comprehensive Metabolic Panel  - CBC W PLT No Diff    Follow-up in 1 year for annual physical.      PERRY GU, DO   11/30/2018 1:45 PM    This document was prepared using voice generated softwear. While every attempt was made for accuracy, spelling and grammatical errors may exist.    ADDENDUM:  11/30/2018   4:56 PM    Results reviewed personally and white blood cell count is slightly low.  Plan to recheck in 4-6 weeks with lab only appointment.  Continue otherwise with plan as above.    Perry Gu

## 2018-11-30 NOTE — MR AVS SNAPSHOT
After Visit Summary   11/30/2018    Cedric Colin    MRN: [id number]           Patient Information     Date Of Birth          1950        Visit Information        Provider Department      11/30/2018 1:00 PM Kanika Gu DO [facility]        Today's Diagnoses     Need for prophylactic vaccination and inoculation against influenza    -  1    Gastroesophageal reflux disease, esophagitis presence not specified        Hyperlipidemia, unspecified hyperlipidemia type        Essential hypertension        Encounter for HCV screening test for low risk patient        High risk medication use           Follow-ups after your visit        Follow-up notes from your care team     Return in about 1 year (around 11/30/2019) for Annual Review.      Future tests that were ordered for you today     Open Future Orders        Priority Expected Expires Ordered    Hepatitis C antibody Routine  12/1/2019 11/30/2018    Lipid panel Routine  12/1/2019 11/30/2018    Magnesium Routine  12/1/2019 11/30/2018    Vitamin B12 Routine  12/1/2019 11/30/2018    Comprehensive Metabolic Panel Routine  11/30/2019 11/30/2018    CBC W PLT No Diff Routine  11/30/2019 11/30/2018            Who to contact     If you have questions or need follow up information about today's clinic visit or your schedule please contact [facility] directly at [phone].  Normal or non-critical lab and imaging results will be communicated to you by MyChart, letter or phone within 4 business days after the clinic has received the results. If you do not hear from us within 7 days, please contact the clinic through MyChart or phone. If you have a critical or abnormal lab result, we will notify you by phone as soon as possible.  Submit refill requests through [employer] or call your pharmacy and they will forward the refill request to us. Please allow 3 business days for your refill to be completed.          Additional  "Information About Your Visit        C-samhart Information     [employer] gives you secure access to your electronic health record. If you see a primary care provider, you can also send messages to your care team and make appointments. If you have questions, please call your primary care clinic.  If you do not have a primary care provider, please call [phone] and they will assist you.        Care EveryWhere ID     This is your Care EveryWhere ID. This could be used by other organizations to access your [facility] medical records  [id number]        Your Vitals Were     Pulse Temperature Respirations Height BMI (Body Mass Index)       64 97.5  F (36.4  C) (Tympanic) 18 6' 2\" (1.88 m) 27.76 kg/m2        Blood Pressure from Last 3 Encounters:   11/30/18 (!) 134/94   05/21/18 114/74   12/13/17 130/74    Weight from Last 3 Encounters:   11/30/18 216 lb 4 oz (98.1 kg)   05/21/18 210 lb 8 oz (95.5 kg)   12/13/17 209 lb 9 oz (95.1 kg)              We Performed the Following     HC FLU VACCINE, INCREASED ANTIGEN, PRESV FREE          Today's Medication Changes          These changes are accurate as of 11/30/18  1:35 PM.  If you have any questions, ask your nurse or doctor.               These medicines have changed or have updated prescriptions.        Dose/Directions    atorvastatin 80 MG tablet   Commonly known as:  LIPITOR   This may have changed:  See the new instructions.   Used for:  Hyperlipidemia, unspecified hyperlipidemia type   Changed by:  Kanika Gu DO        Dose:  80 mg   Take 1 tablet (80 mg) by mouth daily   Quantity:  90 tablet   Refills:  4       lisinopril 40 MG tablet   Commonly known as:  PRINIVIL/ZESTRIL   This may have changed:  See the new instructions.   Used for:  Essential hypertension   Changed by:  Kanika Gu DO        Dose:  40 mg   Take 1 tablet (40 mg) by mouth daily   Quantity:  90 tablet   Refills:  4       omeprazole 20 MG DR capsule   Commonly known as:  priLOSEC   This may have " changed:  See the new instructions.   Used for:  Gastroesophageal reflux disease, esophagitis presence not specified   Changed by:  Kanika Gu DO        Dose:  20 mg   Take 1 capsule (20 mg) by mouth daily   Quantity:  90 capsule   Refills:  4            Where to get your medicines      These medications were sent to [id number] - [place], [place] - [address]  [address], [facility] [address]     Phone:  [phone]     atorvastatin 80 MG tablet    lisinopril 40 MG tablet    omeprazole 20 MG DR capsule                Primary Care Provider Office Phone # Fax #    Kanika Gu [phone]       [address] [facility] [address]        Equal Access to Services     LINDA WERNER : Hadii los jacko Soeduardo, waaxda luqadaha, qaybta kaalmada adeegyada, sean mccartney. So [facility] [phone].    ATENCIÓN: Si habla español, tiene a fry disposición servicios gratuitos de asistencia lingüística. Llame al [phone].    We comply with applicable federal civil rights laws and [place] laws. We do not discriminate on the basis of race, color, national origin, age, disability, sex, sexual orientation, or gender identity.            Thank you!     Thank you for choosing [facility] AND [facility]  for your care. Our goal is always to provide you with excellent care. Hearing back from our patients is one way we can continue to improve our services. Please take a few minutes to complete the written survey that you may receive in the mail after your visit with us. Thank you!             Your Updated Medication List - Protect others around you: Learn how to safely use, store and throw away your medicines at www.disposemymeds.org.          This list is accurate as of 11/30/18  1:35 PM.  Always use your most recent med list.                   Brand Name Dispense Instructions for use Diagnosis    aspirin 81 MG EC tablet      Take 81  mg by mouth.        atorvastatin 80 MG tablet    LIPITOR    90 tablet    Take 1 tablet (80 mg) by mouth daily    Hyperlipidemia, unspecified hyperlipidemia type       D 2000 2000 units tablet   Generic drug:  cholecalciferol      Take 2,000 Units by mouth.        lisinopril 40 MG tablet    PRINIVIL/ZESTRIL    90 tablet    Take 1 tablet (40 mg) by mouth daily    Essential hypertension       Omega-3 1000 MG capsule      Take 2,000 mg by mouth.        omeprazole 20 MG DR capsule    priLOSEC    90 capsule    Take 1 capsule (20 mg) by mouth daily    Gastroesophageal reflux disease, esophagitis presence not specified       UNICAP-M PO      Take  by mouth.

## 2018-12-04 LAB — HCV AB SERPL QL IA: NONREACTIVE

## 2019-04-04 ENCOUNTER — OFFICE VISIT (OUTPATIENT)
Dept: INTERNAL MEDICINE | Facility: OTHER | Age: 69
End: 2019-04-04
Attending: INTERNAL MEDICINE
Payer: MEDICARE

## 2019-04-04 VITALS
RESPIRATION RATE: 18 BRPM | HEIGHT: 74 IN | HEART RATE: 58 BPM | OXYGEN SATURATION: 99 % | TEMPERATURE: 96.6 F | SYSTOLIC BLOOD PRESSURE: 126 MMHG | BODY MASS INDEX: 27.59 KG/M2 | WEIGHT: 215 LBS | DIASTOLIC BLOOD PRESSURE: 70 MMHG

## 2019-04-04 DIAGNOSIS — I10 ESSENTIAL HYPERTENSION: ICD-10-CM

## 2019-04-04 DIAGNOSIS — D72.829 LEUKOCYTOSIS, UNSPECIFIED TYPE: ICD-10-CM

## 2019-04-04 DIAGNOSIS — I48.0 PAROXYSMAL ATRIAL FIBRILLATION (H): ICD-10-CM

## 2019-04-04 DIAGNOSIS — Z01.818 PREOP GENERAL PHYSICAL EXAM: Primary | ICD-10-CM

## 2019-04-04 LAB
BASOPHILS # BLD AUTO: 0.1 10E9/L (ref 0–0.2)
BASOPHILS NFR BLD AUTO: 1.8 %
CREAT SERPL-MCNC: 1.13 MG/DL (ref 0.7–1.3)
DIFFERENTIAL METHOD BLD: NORMAL
EOSINOPHIL # BLD AUTO: 0.2 10E9/L (ref 0–0.7)
EOSINOPHIL NFR BLD AUTO: 4 %
ERYTHROCYTE [DISTWIDTH] IN BLOOD BY AUTOMATED COUNT: 12.3 % (ref 10–15)
GFR SERPL CREATININE-BSD FRML MDRD: 65 ML/MIN/{1.73_M2}
HCT VFR BLD AUTO: 43 % (ref 40–53)
HGB BLD-MCNC: 14.5 G/DL (ref 13.3–17.7)
IMM GRANULOCYTES # BLD: 0 10E9/L (ref 0–0.4)
IMM GRANULOCYTES NFR BLD: 0.4 %
LYMPHOCYTES # BLD AUTO: 1.1 10E9/L (ref 0.8–5.3)
LYMPHOCYTES NFR BLD AUTO: 25.4 %
MCH RBC QN AUTO: 29.8 PG (ref 26.5–33)
MCHC RBC AUTO-ENTMCNC: 33.7 G/DL (ref 31.5–36.5)
MCV RBC AUTO: 89 FL (ref 78–100)
MONOCYTES # BLD AUTO: 0.4 10E9/L (ref 0–1.3)
MONOCYTES NFR BLD AUTO: 8.7 %
NEUTROPHILS # BLD AUTO: 2.7 10E9/L (ref 1.6–8.3)
NEUTROPHILS NFR BLD AUTO: 59.7 %
PLATELET # BLD AUTO: 150 10E9/L (ref 150–450)
POTASSIUM SERPL-SCNC: 4.1 MMOL/L (ref 3.5–5.1)
RBC # BLD AUTO: 4.86 10E12/L (ref 4.4–5.9)
WBC # BLD AUTO: 4.5 10E9/L (ref 4–11)

## 2019-04-04 PROCEDURE — 99214 OFFICE O/P EST MOD 30 MIN: CPT | Performed by: INTERNAL MEDICINE

## 2019-04-04 PROCEDURE — 36415 COLL VENOUS BLD VENIPUNCTURE: CPT | Performed by: INTERNAL MEDICINE

## 2019-04-04 PROCEDURE — 93005 ELECTROCARDIOGRAM TRACING: CPT | Performed by: INTERNAL MEDICINE

## 2019-04-04 PROCEDURE — 85025 COMPLETE CBC W/AUTO DIFF WBC: CPT | Performed by: INTERNAL MEDICINE

## 2019-04-04 PROCEDURE — 93010 ELECTROCARDIOGRAM REPORT: CPT | Performed by: INTERNAL MEDICINE

## 2019-04-04 PROCEDURE — 84132 ASSAY OF SERUM POTASSIUM: CPT | Performed by: INTERNAL MEDICINE

## 2019-04-04 PROCEDURE — G0463 HOSPITAL OUTPT CLINIC VISIT: HCPCS

## 2019-04-04 PROCEDURE — 82565 ASSAY OF CREATININE: CPT | Performed by: INTERNAL MEDICINE

## 2019-04-04 ASSESSMENT — MIFFLIN-ST. JEOR: SCORE: 1814.98

## 2019-04-04 ASSESSMENT — PAIN SCALES - GENERAL: PAINLEVEL: NO PAIN (0)

## 2019-04-04 NOTE — PATIENT INSTRUCTIONS
PREOP RECOMMENDATIONS:  -- Hold aspirin for 7 days before surgery  -- Hold Advil/ibuprofen, Aleve/naproxen, for 2-3 days prior to surgery  -- Hold vitamins and supplements for 2 weeks prior to surgery  -- Take your regular medication of the morning of surgery  -- Okay to use Tylenol (Acetaminophen)  -- No food or drink after midnight the night before surgery    Please call our office and the surgical services with any change in condition or new symptoms that develop between today and your surgical date, in particular if you have anynew chest pain, shortness of breath, swelling or fevers.       Before Your Surgery      Call your surgeon if there is any change in your health. This includes signs of a cold or flu (such as a sore throat, runny nose, cough, rash or fever).    Do not smoke, drink alcohol or take over the counter medicine (unless your surgeon or primary care doctor tells you to) for the 24 hours before and after surgery.    If you take prescribed drugs: Follow your doctor s orders about which medicines to take and which to stop until after surgery.    Eating and drinking prior to surgery: follow the instructions from your surgeon    Take a shower or bath the night before surgery. Use the soap your surgeon gave you to gently clean your skin. If you do not have soap from your surgeon, use your regular soap. Do not shave or scrub the surgery site.  Wear clean pajamas and have clean sheets on your bed.

## 2019-04-04 NOTE — PROGRESS NOTES
[facility] AND [facility]  [address]  [place] [address]  [phone]  Dept: [phone]    PRE-OP EVALUATION:  Today's date: 2019    Cedric Colin (: 1950) presents for pre-operative evaluation assessment as requested by Dr. Severson.  He requires evaluation and anesthesia risk assessment prior to undergoing surgery/procedure for evaluation of Longo's esophagus.    Proposed Surgery/ Procedure: EGD  Date of Surgery/ Procedure: 2019  Time of Surgery/ Procedure: TBD  Hospital/Surgical Facility: [facility]    Primary Physician: Kanika Gu  Type of Anesthesia Anticipated: to be determined    Patient has a Health Care Directive or Living Will:  NO    1. NO - Do you have a history of heart attack, stroke, stent, bypass or surgery on an artery in the head, neck, heart or legs?  2. NO - Do you ever have any pain or discomfort in your chest?  3. NO - Do you have a history of  Heart Failure?  4. NO - Are you troubled by shortness of breath when: walking on the level, up a slight hill or at night?  5. YES - Do you currently have a cold, bronchitis or other respiratory infection?  6. NO - Do you have a cough, shortness of breath or wheezing?  7. NO - Do you sometimes get pains in the calves of your legs when you walk?  8. NO - Do you or anyone in your family have previous history of blood clots?  9. NO - Do you or does anyone in your family have a serious bleeding problem such as prolonged bleeding following surgeries or cuts?  10. NO - Have you ever had problems with anemia or been told to take iron pills?  11. NO - Have you had any abnormal blood loss such as black, tarry or bloody stools, or abnormal vaginal bleeding?  12. NO - Have you ever had a blood transfusion?  13. NO - Have you or any of your relatives ever had problems with anesthesia?  14. NO - Do you have sleep apnea, excessive snoring or daytime drowsiness?  15. NO - Do you have any prosthetic heart  "valves?  16. NO - Do you have prosthetic joints?        HPI:     HPI related to upcoming procedure: Patient has a history of Barretts.  His last EGD was in 2015 and he was supposed to have a repeat scope in 2018.  His symptoms overall are well controlled.  He continues on omeprazole daily.    A-FIB - Patient has a history of A-fib which has been resolved as far as he knows for several years.  He is on an aspirin.  He denies significant symptoms of lightheadedness, palpitations or dyspnea.                                                                                                                                                                             .  HYPERTENSION - Patient has longstanding history of HTN , currently denies any symptoms referable to elevated blood pressure. Specifically denies chest pain, palpitations, dyspnea, orthopnea, PND or peripheral edema. Blood pressure readings have been in normal range. Current medication regimen is as listed below. Patient denies any side effects of medication.                                                                                                                                                                                          .  He was recently noted to have leukocytopenia on CBC done in Nov.  He was supposed to repeat this in 4-6 weeks however has not had recheck yet.  He denies any signs of bleeding, infection or anemia.      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     History of varicose veins      Hyperlipidemia      Impingement syndrome of left shoulder 10/28/2015     " Paroxysmal atrial fibrillation (H)     ongoing despite s/p multiple ablation with Dr. Ivey     Pericarditis     Inflammatory, treated with anti-inflammatories, with pericardial effusion. Dr. Zheng     Primary osteoarthritis of shoulder 10/28/2015     Vitamin D deficiency      Past Surgical History:   Procedure Laterality Date     COLONOSCOPY  2018    polyp - return 5 years     COLONOSCOPY  2008     HEART CATH SEPTAL ABLATION       For chronic atrial fibrillation, [facility]     HERNIA REPAIR Left 2010    Left with mesh.     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     Current Outpatient Medications   Medication Sig Dispense Refill     aspirin EC 81 MG EC tablet Take 81 mg by mouth.       atorvastatin (LIPITOR) 80 MG tablet Take 1 tablet (80 mg) by mouth daily 90 tablet 4     cholecalciferol (D 2000) 2000 UNITS tablet Take 2,000 Units by mouth.       lisinopril (PRINIVIL/ZESTRIL) 40 MG tablet Take 1 tablet (40 mg) by mouth daily 90 tablet 4     Multiple Vitamins-Minerals (UNICAP-M PO) Take  by mouth.       Omega-3 1000 MG CAPS Take 2,000 mg by mouth.       omeprazole (PRILOSEC) 20 MG DR capsule Take 1 capsule (20 mg) by mouth daily 90 capsule 4     OTC products: None, except as noted above    Allergies   Allergen Reactions     Flecainide Other (See Comments)     States it makes him feel terrible, slow heart rate      Simvastatin Nausea     Reacts with warfarin        Latex Allergy: NO    Social History     Tobacco Use     Smoking status: Former Smoker     Packs/day: 1.50     Years: 10.00     Pack years: 15.00     Types: Cigarettes     Last attempt to quit: 1980     Years since quittin.2     Smokeless tobacco: Never Used   Substance Use Topics     Alcohol use: No     Alcohol/week: 0.0 oz     Comment: Alcoholic Drinks/day: rarely     History   Drug Use No     Comment: No IV drug use       REVIEW OF SYSTEMS:   ROS  GEN: -fevers/-chills/-night sweats/-wt  "change  NEURO: -headaches/-vision changes  EARS: -hearing  NOSE: -drainage/-congestion  MOUTH/THROAT: - sore throat/-dysphagia/-sores  LUNGS: -sob/-cough  CARDIOVASCULAR: -cp/-palpitations  GI: -pain/-diarrhea/-constipation/-bloody stools  : -dysuria/-hematuria  ENDOCRINE: -skin or hair changes  HEMATOLOGIC/LYMPHATIC: -swollen nodes/-easy bruising  SKIN: -rashes/-lesions  MSK/RHEUM: +rare joint pain/-swelling  NEURO: -weakness/-parasthesias  PSYCH: -anhedonia/-depression/-anxiety        EXAM:   /70 (BP Location: Right arm, Patient Position: Sitting, Cuff Size: Adult Regular)   Pulse 58   Temp 96.6  F (35.9  C) (Tympanic)   Resp 18   Ht 1.88 m (6' 2\")   Wt 97.5 kg (215 lb)   SpO2 99%   BMI 27.60 kg/m      GENERAL APPEARANCE: healthy, alert and no distress     EYES: EOMI,  PERRL     HENT: ear canals and TM's normal and nose and mouth without ulcers or lesions     NECK: no adenopathy, no asymmetry, masses, or scars and thyroid normal to palpation     RESP: lungs clear to auscultation - no rales, rhonchi or wheezes     CV: irregular rhythm today, normal rate, normal S1 S2, no S3 or S4 and no murmur, click or rub     ABDOMEN:  soft, nontender, no HSM or masses and bowel sounds normal     MS: extremities normal- no gross deformities noted, no evidence of inflammation in joints, FROM in all extremities.     SKIN: no suspicious lesions or rashes     NEURO: Normal strength and tone, sensory exam grossly normal, mentation intact and speech normal     PSYCH: mentation appears normal. and affect normal/bright     LYMPHATICS: No cervical adenopathy    DIAGNOSTICS:     LABS: 4/4/2019 - Personally ordered/reviewed  Results for orders placed or performed in visit on 04/04/19   Potassium   Result Value Ref Range    Potassium 4.1 3.5 - 5.1 mmol/L   Creatinine   Result Value Ref Range    Creatinine 1.13 0.70 - 1.30 mg/dL    GFR Estimate 65 >60 mL/min/[1.73_m2]    GFR Estimate If Black 78 >60 mL/min/[1.73_m2]   CBC and " Differential   Result Value Ref Range    WBC 4.5 4.0 - 11.0 10e9/L    RBC Count 4.86 4.4 - 5.9 10e12/L    Hemoglobin 14.5 13.3 - 17.7 g/dL    Hematocrit 43.0 40.0 - 53.0 %    MCV 89 78 - 100 fl    MCH 29.8 26.5 - 33.0 pg    MCHC 33.7 31.5 - 36.5 g/dL    RDW 12.3 10.0 - 15.0 %    Platelet Count 150 150 - 450 10e9/L    Diff Method Automated Method     % Neutrophils 59.7 %    % Lymphocytes 25.4 %    % Monocytes 8.7 %    % Eosinophils 4.0 %    % Basophils 1.8 %    % Immature Granulocytes 0.4 %    Absolute Neutrophil 2.7 1.6 - 8.3 10e9/L    Absolute Lymphocytes 1.1 0.8 - 5.3 10e9/L    Absolute Monocytes 0.4 0.0 - 1.3 10e9/L    Absolute Eosinophils 0.2 0.0 - 0.7 10e9/L    Absolute Basophils 0.1 0.0 - 0.2 10e9/L    Abs Immature Granulocytes 0.0 0 - 0.4 10e9/L      EKG: NSR with premature atrial complexes.  When compared to prior, PACs are new.    IMPRESSION:   Reason for surgery/procedure: H/o barretts disease  Diagnosis/reason for consult: HTN, A fib    The proposed surgical procedure is considered LOW risk.    REVISED CARDIAC RISK INDEX  The patient has the following serious cardiovascular risks for perioperative complications such as (MI, PE, VFib and 3  AV Block):  No serious cardiac risks    INTERPRETATION: 0 risks: Class I (very low risk - 0.4% complication rate)    The patient has the following additional risks for perioperative complications:  No identified additional risks      ICD-10-CM    1. Preop general physical exam Z01.818    2. Essential hypertension I10 Creatinine     Potassium     Potassium     Creatinine   3. Paroxysmal atrial fibrillation (H) I48.0 EKG 12-lead, tracing only   4. Leukocytosis, unspecified type D72.829 CBC and Differential     CBC and Differential     BP well controlled.  Stable on current meds.  With irregular rhythm on exam, EKG was done and shows PACs with no concern for recurrent A fib.  Leukocytosis has resolved.      RECOMMENDATIONS:     --Patient is to take all scheduled  medications on the day of surgery EXCEPT as listed in patient instructions    APPROVAL GIVEN to proceed with proposed procedure, without further diagnostic evaluation       Signed Electronically by: Kanika Gu DO    Copy of this evaluation report is provided to requesting physician.    [facility] Preop Guidelines    Revised Cardiac Risk Index

## 2019-04-04 NOTE — NURSING NOTE
This patient presents today for a Preoperative exam for this procedure:  EGD  Date of Surgery:  04/16/2019  Surgeon:  Dr. Sauer  Facility:  [facility]  Fax:  RACHELL Thao LPN............. April 4, 2019 2:24 PM

## 2019-04-16 ENCOUNTER — TRANSFERRED RECORDS (OUTPATIENT)
Dept: HEALTH INFORMATION MANAGEMENT | Facility: OTHER | Age: 69
End: 2019-04-16

## 2019-05-07 ENCOUNTER — OFFICE VISIT (OUTPATIENT)
Dept: FAMILY MEDICINE | Facility: OTHER | Age: 69
End: 2019-05-07
Attending: NURSE PRACTITIONER
Payer: COMMERCIAL

## 2019-05-07 VITALS
BODY MASS INDEX: 27.78 KG/M2 | WEIGHT: 216.4 LBS | RESPIRATION RATE: 18 BRPM | HEART RATE: 70 BPM | SYSTOLIC BLOOD PRESSURE: 138 MMHG | DIASTOLIC BLOOD PRESSURE: 90 MMHG | TEMPERATURE: 98.8 F

## 2019-05-07 DIAGNOSIS — J30.2 SEASONAL ALLERGIC RHINITIS, UNSPECIFIED TRIGGER: Primary | ICD-10-CM

## 2019-05-07 PROCEDURE — 99213 OFFICE O/P EST LOW 20 MIN: CPT | Performed by: NURSE PRACTITIONER

## 2019-05-07 PROCEDURE — G0463 HOSPITAL OUTPT CLINIC VISIT: HCPCS

## 2019-05-07 ASSESSMENT — PAIN SCALES - GENERAL: PAINLEVEL: NO PAIN (0)

## 2019-05-07 NOTE — PATIENT INSTRUCTIONS
Symptomatic treatments recommended.  -Discussed that antibiotics would not help symptoms of viral URI. Education provided on symptoms of secondary bacterial infection such as new fever, chills, rigors, shortness of breath, increased work of breathing, that can occur with viral URI and need for further evaluation, if they occur.   - Ensure you are staying hydrated by drinking plenty of fluids or eating foods such as popsicles, jello, pudding.  - Honey and Salt water gurgles can help soothe sore throat  - Rest  - Humidifier can help with congestion and help keep mucus membranes such as throat and nose from drying out.  - Sleeping slightly propped up can help with congestion and postnasal drainage that can worsen cough at bedtime.  - As long as you have never been told to take Tylenol and/or Ibuprofen you can use them to manage fever and body aches per package instructions  Make sure you eat when you take ibuprofen to avoid stomach upset.  - If sudden onset of new fever, worsening symptoms return for further evaluation.  - OTC antihistamine such as Allegra, Zyrtec, Claritin (generic is okay) can help with nasal/sinus congestion and OTC nasal steroid such as Flonase can help decrease sinus inflammation to help with congestion.

## 2019-05-07 NOTE — PROGRESS NOTES
SUBJECTIVE:   Cedric Colin is a 69 year old male who presents to clinic today for the following   health issues:    ENT Symptoms             Symptoms: cc Present Absent Comment   Fever/Chills   x    Fatigue   x    Muscle Aches   x    Eye Irritation   x    Sneezing   x    Nasal Jaxon/Drg  x  Slightly congested   Sinus Pressure/Pain   x    Loss of smell   x    Dental pain   x    Sore Throat  x  ongoing   Swollen Glands   x    Ear Pain/Fullness   x    Cough   x    Wheeze   x    Chest Pain   x    Shortness of breath   x    Rash   x    Other   x      Symptom duration:  1 month or longer   Symptom severity:  mild   Treatments tried:  nothing - has taken a cold medicine a few times, did feel better   Contacts:  none     Additional history: as documented    Reviewed  and updated as needed this visit by clinical staff  Tobacco  Allergies  Meds  Problems  Med Hx  Surg Hx  Fam Hx  Soc Hx          Reviewed and updated as needed this visit by Provider  Tobacco  Allergies  Meds  Problems  Med Hx  Surg Hx  Fam Hx  Soc Hx          Patient Active Problem List   Diagnosis     Gastroesophageal reflux disease     Hyperlipidemia     Hypertension     Paroxysmal atrial fibrillation (H)     Essential hypertension     Varicose vein     Coronary atherosclerosis of native coronary artery     Atherosclerosis of aorta (H)     Acute pericardial effusion     Past Surgical History:   Procedure Laterality Date     COLONOSCOPY  2018    polyp - return 5 years     HEART CATH SEPTAL ABLATION      / For chronic atrial fibrillation, [facility]     HERNIA REPAIR Left 2010    Left with mesh.     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       Social History     Tobacco Use     Smoking status: Former Smoker     Packs/day: 1.50     Years: 10.00     Pack years: 15.00     Types: Cigarettes     Last attempt to quit: 1980     Years since quittin.3     Smokeless tobacco: Never Used   Substance Use Topics      Alcohol use: No     Alcohol/week: 0.0 oz     Comment: Alcoholic Drinks/day: rarely     Family History   Problem Relation Age of Onset     Heart Disease Mother 89        Heart Disease, possible heart disease     Other - See Comments Father 95        macular degeneration/kidney removed     No Known Problems Sister      No Known Problems Brother      Heart Disease Maternal Uncle         Heart Disease,2  heart disease     Colon Cancer No family hx of         Cancer-colon     Prostate Cancer No family hx of         Cancer-prostate         Current Outpatient Medications   Medication Sig Dispense Refill     aspirin EC 81 MG EC tablet Take 81 mg by mouth.       atorvastatin (LIPITOR) 80 MG tablet Take 1 tablet (80 mg) by mouth daily 90 tablet 4     cholecalciferol (D 2000) 2000 UNITS tablet Take 2,000 Units by mouth.       lisinopril (PRINIVIL/ZESTRIL) 40 MG tablet Take 1 tablet (40 mg) by mouth daily 90 tablet 4     Multiple Vitamins-Minerals (UNICAP-M PO) Take  by mouth.       Omega-3 1000 MG CAPS Take 2,000 mg by mouth.       omeprazole (PRILOSEC) 20 MG DR capsule Take 1 capsule (20 mg) by mouth daily 90 capsule 4     Allergies   Allergen Reactions     Flecainide Other (See Comments)     States it makes him feel terrible, slow heart rate      Simvastatin Nausea     Reacts with warfarin         ROS:  As above    OBJECTIVE:     /90 (BP Location: Right arm, Patient Position: Sitting, Cuff Size: Adult Regular)   Pulse 70   Temp 98.8  F (37.1  C) (Temporal)   Resp 18   Wt 98.2 kg (216 lb 6.4 oz)   BMI 27.78 kg/m    Body mass index is 27.78 kg/m .  GENERAL: healthy, alert and no distress  EYES: Eyes grossly normal to inspection, PERRL and conjunctivae and sclerae normal  HENT: normal cephalic/atraumatic, both ears: clear effusion, nasal mucosa edematous , rhinorrhea clear, oral mucous membranes moist, sinuses: not tender and cobblestoning of posterior oropharynx  NECK: no adenopathy, no asymmetry, masses, or  scars and thyroid normal to palpation  RESP: lungs clear to auscultation - no rales, rhonchi or wheezes  CV: regular rate and rhythm, normal S1 S2, no S3 or S4, no murmur, click or rub, no peripheral edema and peripheral pulses strong  MS: no gross musculoskeletal defects noted, no edema  SKIN: no suspicious lesions or rashes  NEURO: Normal strength and tone, mentation intact and speech normal  PSYCH: mentation appears normal, affect normal/bright    Diagnostic Test Results:  none     ASSESSMENT/PLAN:     1. Seasonal allergic rhinitis, unspecified trigger  Symptomatic treatments recommended.  -Discussed that antibiotics would not help symptoms of viral URI. Education provided on symptoms of secondary bacterial infection such as new fever, chills, rigors, shortness of breath, increased work of breathing, that can occur with viral URI and need for further evaluation, if they occur.   - Ensure you are staying hydrated by drinking plenty of fluids or eating foods such as popsicles, jello, pudding.  - Honey and Salt water gurgles can help soothe sore throat  - Rest  - Humidifier can help with congestion and help keep mucus membranes such as throat and nose from drying out.  - Sleeping slightly propped up can help with congestion and postnasal drainage that can worsen cough at bedtime.  - As long as you have never been told to take Tylenol and/or Ibuprofen you can use them to manage fever and body aches per package instructions  Make sure you eat when you take ibuprofen to avoid stomach upset.  - If sudden onset of new fever, worsening symptoms return for further evaluation.  - OTC antihistamine such as Allegra, Zyrtec, Claritin (generic is okay) can help with nasal/sinus congestion and OTC nasal steroid such as Flonase can help decrease sinus inflammation to help with congestion.    Anne-Marie Macdonald NP  [facility] AND [facility]

## 2019-05-13 ENCOUNTER — MYC MEDICAL ADVICE (OUTPATIENT)
Dept: INTERNAL MEDICINE | Facility: OTHER | Age: 69
End: 2019-05-13

## 2019-05-13 NOTE — TELEPHONE ENCOUNTER
I would recommend that patient be seen.    I can see him Wednesday at 11:00 or if he feels he  needs to be seen sooner he can see any available provider.

## 2019-05-13 NOTE — TELEPHONE ENCOUNTER
Contacted the patient and he reports that he now has a appointment with Mary BRADY tomorrow at 11am.  Marion Pope LPN on 5/13/2019 at 2:36 PM

## 2019-05-14 ENCOUNTER — OFFICE VISIT (OUTPATIENT)
Dept: INTERNAL MEDICINE | Facility: OTHER | Age: 69
End: 2019-05-14
Attending: NURSE PRACTITIONER
Payer: COMMERCIAL

## 2019-05-14 VITALS
HEART RATE: 72 BPM | OXYGEN SATURATION: 99 % | TEMPERATURE: 97.7 F | BODY MASS INDEX: 28.14 KG/M2 | RESPIRATION RATE: 18 BRPM | SYSTOLIC BLOOD PRESSURE: 132 MMHG | DIASTOLIC BLOOD PRESSURE: 84 MMHG | WEIGHT: 219.2 LBS

## 2019-05-14 DIAGNOSIS — M53.3 SI (SACROILIAC) JOINT DYSFUNCTION: Primary | ICD-10-CM

## 2019-05-14 PROCEDURE — G0463 HOSPITAL OUTPT CLINIC VISIT: HCPCS

## 2019-05-14 PROCEDURE — 99213 OFFICE O/P EST LOW 20 MIN: CPT | Performed by: NURSE PRACTITIONER

## 2019-05-14 RX ORDER — HYDROCODONE BITARTRATE AND ACETAMINOPHEN 5; 325 MG/1; MG/1
1 TABLET ORAL 3 TIMES DAILY PRN
Qty: 20 TABLET | Refills: 0 | Status: SHIPPED | OUTPATIENT
Start: 2019-05-14 | End: 2019-05-21

## 2019-05-14 RX ORDER — CYCLOBENZAPRINE HCL 5 MG
TABLET ORAL
Qty: 30 TABLET | Refills: 1 | Status: SHIPPED | OUTPATIENT
Start: 2019-05-14 | End: 2019-12-17 | Stop reason: ALTCHOICE

## 2019-05-14 ASSESSMENT — PAIN SCALES - GENERAL: PAINLEVEL: SEVERE PAIN (6)

## 2019-05-14 NOTE — NURSING NOTE
Chief Complaint   Patient presents with     Pain     lower back and right leg x2 days      Was doing yard work on Sunday and feels like he over did it. Was lifting and pulling on things. Rates pain 6/10 while sitting, 8 or 9 with activity such as walking and 10/10 when sleeping.     Medication Reconciliation: complete    Tierra White, LPN

## 2019-05-14 NOTE — PROGRESS NOTES
"Subjective:  He is here today for right low back/buttock discomfort.  This began on Sunday after he was moving heavy logs outdoors.  He was pulling these and then stacking these 5 foot logs.  He states he has had this problem before in the past but has not lasted as long.  He states the pain radiates down the side of his leg to the front of the knee.  It is a dull ache down the leg.  It is a constant pain at the right low back.  He has pain with lying in bed and walking.  He feels better with sitting.  He has not had fever or chills, weakness of the extremities or bowel or bladder dysfunction.  In the past he has tried hydrocodone which has been effective.  He reports his pain began on Sunday.  He took ibuprofen 400 mg on 3 occasions since yesterday afternoon and the day prior took Tylenol extra strength.  Found very little relief.    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     History of varicose veins      Hyperlipidemia      Impingement syndrome of left shoulder 10/28/2015     Paroxysmal atrial fibrillation (H)     ongoing despite s/p multiple ablation with Dr. Ivey     Pericarditis 2011    Inflammatory, treated with anti-inflammatories, with pericardial effusion. Dr. Zheng     Primary osteoarthritis of shoulder 10/28/2015     Vitamin D " deficiency      Past Surgical History:   Procedure Laterality Date     COLONOSCOPY  2018    polyp - return 5 years     HEART CATH SEPTAL ABLATION       For chronic atrial fibrillation, [facility]     HERNIA REPAIR Left 2010    Left with mesh.     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     Social History     Socioeconomic History     Marital status:      Spouse name: Not on file     Number of children: Not on file     Years of education: Not on file     Highest education level: Not on file   Occupational History     Occupation:      Employer: OTHER   Social Needs     Financial resource strain: Not on file     Food insecurity:     Worry: Not on file     Inability: Not on file     Transportation needs:     Medical: Not on file     Non-medical: Not on file   Tobacco Use     Smoking status: Former Smoker     Packs/day: 1.50     Years: 10.00     Pack years: 15.00     Types: Cigarettes     Last attempt to quit: 1980     Years since quittin.3     Smokeless tobacco: Never Used   Substance and Sexual Activity     Alcohol use: No     Alcohol/week: 0.0 oz     Comment: Alcoholic Drinks/day: rarely     Drug use: No     Comment: No IV drug use     Sexual activity: Yes     Partners: Female   Lifestyle     Physical activity:     Days per week: Not on file     Minutes per session: Not on file     Stress: Not on file   Relationships     Social connections:     Talks on phone: Not on file     Gets together: Not on file     Attends Muslim service: Not on file     Active member of club or organization: Not on file     Attends meetings of clubs or organizations: Not on file     Relationship status: Not on file     Intimate partner violence:     Fear of current or ex partner: Not on file     Emotionally abused: Not on file     Physically abused: Not on file     Forced sexual activity: Not on file   Other Topics Concern     Parent/sibling w/ CABG, MI or angioplasty before 65F  55M? 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  Exercise, jogs regularly with his dog.  Lives on [facility].  Wrote a novel, and book of poems.     Family History   Problem Relation Age of Onset     Heart Disease Mother 89        Heart Disease, possible heart disease     Other - See Comments Father 95        macular degeneration/kidney removed     No Known Problems Sister      No Known Problems Brother      Heart Disease Maternal Uncle         Heart Disease,2  heart disease     Colon Cancer No family hx of         Cancer-colon     Prostate Cancer No family hx of         Cancer-prostate     Current Outpatient Medications   Medication Sig Dispense Refill     aspirin EC 81 MG EC tablet Take 81 mg by mouth.       atorvastatin (LIPITOR) 80 MG tablet Take 1 tablet (80 mg) by mouth daily 90 tablet 4     cholecalciferol (D 2000) 2000 UNITS tablet Take 2,000 Units by mouth.       cyclobenzaprine (FLEXERIL) 5 MG tablet 1-2 tablets three times daily as needed 30 tablet 1     HYDROcodone-acetaminophen (NORCO) 5-325 MG tablet Take 1 tablet by mouth 3 times daily as needed for pain 20 tablet 0     lisinopril (PRINIVIL/ZESTRIL) 40 MG tablet Take 1 tablet (40 mg) by mouth daily 90 tablet 4     Multiple Vitamins-Minerals (UNICAP-M PO) Take  by mouth.       Omega-3 1000 MG CAPS Take 2,000 mg by mouth.       omeprazole (PRILOSEC) 20 MG DR capsule Take 1 capsule (20 mg) by mouth daily 90 capsule 4     Flecainide and Simvastatin      Review of Systems:  Review of Systems  See HPI  Objective:   /84 (BP Location: Right arm, Patient Position: Sitting, Cuff Size: Adult Regular)   Pulse 72   Temp 97.7  F (36.5  C) (Tympanic)   Resp 18   Wt 99.4 kg (219 lb 3.2 oz)   SpO2 99%   BMI 28.14 kg/m    Physical Exam  Pleasant gentleman who is uncomfortable with lying flat but appears comfortable in a seated position.  Skin color pink.  Lung  fields clear to auscultation.  Cardiovascular regular rate and rhythm.  No pain with palpation to the lumbar spine.  There is pinpoint tenderness over the right sacroiliac joint.  DTRs in lower extremity are symmetrical.  Normal strength, sensation and range of motion of lower extremities bilaterally.  No pain with palpation to the right hip.  He has no pain with range of motion of the right hip with the exception of abduction which causes pain at the right SI joint.    Assessment:    ICD-10-CM    1. Acute right-sided low back pain with right-sided sciatica M54.41 cyclobenzaprine (FLEXERIL) 5 MG tablet     HYDROcodone-acetaminophen (NORCO) 5-325 MG tablet       Plan:   Patient has acute sacroiliac dysfunction causing right sciatica.  No x-ray needed at this point.  He did not have an acute injury.  Would recommend ibuprofen 800 mg 3 times daily with food for 1 week, Flexeril 5-10 mg up to 3 times daily as needed for muscle spasms, ice to the affected area 15 minutes 3 times daily as tolerated and may use topical analgesics if needed.  I did give him a prescription for hydrocodone acetaminophen 5/325 mg 1 tablet up to 3 times daily as needed.  He will not take the Flexeril and hydrocodone together and would recommend they are spaced at least 4 hours apart.  Side effects of all medications are reviewed and discussed with patient and his significant other.  If he is failing to improve by at least 50% within the next 5 days would recommend follow-up, sooner if getting worse especially if develops increased pain, fever or change in bowel or bladder function or weakness of the extremities.    CALI Davidson   5/14/2019  11:50 AM

## 2019-05-14 NOTE — PATIENT INSTRUCTIONS
Ibuprofen 800 mg 3 times daily for 7 days with food, flexeril as needed for muscle spasms, cold pack to right low back for 15 minutes 3 times daily, rest.  Follow up in 5 days, sooner if getting worse.  Lortab 5/325 1 tablet three times daily as needed.

## 2019-05-21 ENCOUNTER — HOSPITAL ENCOUNTER (OUTPATIENT)
Dept: GENERAL RADIOLOGY | Facility: OTHER | Age: 69
Discharge: HOME | End: 2019-05-21
Attending: NURSE PRACTITIONER | Admitting: NURSE PRACTITIONER
Payer: MEDICARE

## 2019-05-21 ENCOUNTER — OFFICE VISIT (OUTPATIENT)
Dept: INTERNAL MEDICINE | Facility: OTHER | Age: 69
End: 2019-05-21
Attending: NURSE PRACTITIONER
Payer: MEDICARE

## 2019-05-21 ENCOUNTER — HOSPITAL ENCOUNTER (OUTPATIENT)
Dept: GENERAL RADIOLOGY | Facility: OTHER | Age: 69
End: 2019-05-21
Attending: NURSE PRACTITIONER
Payer: MEDICARE

## 2019-05-21 VITALS
HEART RATE: 76 BPM | SYSTOLIC BLOOD PRESSURE: 128 MMHG | OXYGEN SATURATION: 97 % | BODY MASS INDEX: 28.16 KG/M2 | WEIGHT: 219.3 LBS | TEMPERATURE: 97.7 F | DIASTOLIC BLOOD PRESSURE: 80 MMHG | RESPIRATION RATE: 18 BRPM

## 2019-05-21 DIAGNOSIS — M54.41 ACUTE RIGHT-SIDED LOW BACK PAIN WITH RIGHT-SIDED SCIATICA: ICD-10-CM

## 2019-05-21 DIAGNOSIS — M53.3 SI (SACROILIAC) JOINT DYSFUNCTION: ICD-10-CM

## 2019-05-21 DIAGNOSIS — M53.3 SI (SACROILIAC) JOINT DYSFUNCTION: Primary | ICD-10-CM

## 2019-05-21 PROCEDURE — G0463 HOSPITAL OUTPT CLINIC VISIT: HCPCS | Mod: 25

## 2019-05-21 PROCEDURE — 72100 X-RAY EXAM L-S SPINE 2/3 VWS: CPT

## 2019-05-21 PROCEDURE — G0463 HOSPITAL OUTPT CLINIC VISIT: HCPCS

## 2019-05-21 PROCEDURE — 72202 X-RAY EXAM SI JOINTS 3/> VWS: CPT

## 2019-05-21 PROCEDURE — 99213 OFFICE O/P EST LOW 20 MIN: CPT | Performed by: NURSE PRACTITIONER

## 2019-05-21 RX ORDER — HYDROCODONE BITARTRATE AND ACETAMINOPHEN 5; 325 MG/1; MG/1
1 TABLET ORAL 3 TIMES DAILY PRN
Qty: 30 TABLET | Refills: 0 | Status: SHIPPED | OUTPATIENT
Start: 2019-05-21 | End: 2019-06-13

## 2019-05-21 ASSESSMENT — PAIN SCALES - GENERAL: PAINLEVEL: EXTREME PAIN (8)

## 2019-05-21 NOTE — NURSING NOTE
Chief Complaint   Patient presents with     RECHECK     sciatic nerve      Still is having pain. Had a great day yesterday but now is hurting worse today. Has been resting, and has been taking ibuprofen, hydro and flexeril with minimal relief. Did not take anything today due to having to drive.     Medication Reconciliation: complete    Tierra White LPN

## 2019-05-21 NOTE — PROGRESS NOTES
"Subjective:  He is here today for follow-up of SI joint dysfunction.  He states that he was actually doing fairly well since last Friday but then today started having more pain once again.  This is located at the right buttock.  The pain radiates down the buttock but over along the right lateral leg.  He denies diminished strength.  He has normal sensation.  No change in bowel or bladder function.  He has been taking Flexeril 5 mg in the morning and usually 10 or 15 mg at bedtime, hydrocodone acetaminophen 1 tablet 3 times daily and ibuprofen 800 mg 3 times daily however reduce down to 400 mg as of this morning because he was getting an upset stomach.  The 400 mg dose did not bother his stomach.  He has been taking it with food.  He is almost out of the hydrocodone acetaminophen tablets and would need a refill.  He is tolerating these without side effects.  He did not take one yet today.  He denies pain with lying on the affected hip.  He denies difficulty starting and stopping urinary stream.  He reports he has had this type of pain before in the past but last occurred about a year ago but not as intense as with this episode.  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     History of varicose veins      Hyperlipidemia      Impingement syndrome of left " shoulder 10/28/2015     Paroxysmal atrial fibrillation (H)     ongoing despite s/p multiple ablation with Dr. Ivey     Pericarditis     Inflammatory, treated with anti-inflammatories, with pericardial effusion. Dr. Zheng     Primary osteoarthritis of shoulder 10/28/2015     Vitamin D deficiency      Past Surgical History:   Procedure Laterality Date     COLONOSCOPY  2018    polyp - return 5 years     HEART CATH SEPTAL ABLATION       For chronic atrial fibrillation, [facility]     HERNIA REPAIR Left 2010    Left with mesh.     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     Social History     Socioeconomic History     Marital status:      Spouse name: Not on file     Number of children: Not on file     Years of education: Not on file     Highest education level: Not on file   Occupational History     Occupation:      Employer: OTHER   Social Needs     Financial resource strain: Not on file     Food insecurity:     Worry: Not on file     Inability: Not on file     Transportation needs:     Medical: Not on file     Non-medical: Not on file   Tobacco Use     Smoking status: Former Smoker     Packs/day: 1.50     Years: 10.00     Pack years: 15.00     Types: Cigarettes     Last attempt to quit: 1980     Years since quittin.4     Smokeless tobacco: Never Used   Substance and Sexual Activity     Alcohol use: No     Alcohol/week: 0.0 oz     Comment: Alcoholic Drinks/day: rarely     Drug use: No     Comment: No IV drug use     Sexual activity: Yes     Partners: Female   Lifestyle     Physical activity:     Days per week: Not on file     Minutes per session: Not on file     Stress: Not on file   Relationships     Social connections:     Talks on phone: Not on file     Gets together: Not on file     Attends Yazidism service: Not on file     Active member of club or organization: Not on file     Attends meetings of clubs or organizations: Not on file     Relationship  status: Not on file     Intimate partner violence:     Fear of current or ex partner: Not on file     Emotionally abused: Not on file     Physically abused: Not on file     Forced sexual activity: Not on file   Other Topics Concern     Parent/sibling w/ CABG, MI or angioplasty before 65F 55M? 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  Exercise, jogs regularly with his dog.  Lives on [facility].  Wrote a novel, and book of poems.     Family History   Problem Relation Age of Onset     Heart Disease Mother 89        Heart Disease, possible heart disease     Other - See Comments Father 95        macular degeneration/kidney removed     No Known Problems Sister      No Known Problems Brother      Heart Disease Maternal Uncle         Heart Disease,2  heart disease     Colon Cancer No family hx of         Cancer-colon     Prostate Cancer No family hx of         Cancer-prostate     Current Outpatient Medications   Medication Sig Dispense Refill     aspirin EC 81 MG EC tablet Take 81 mg by mouth.       atorvastatin (LIPITOR) 80 MG tablet Take 1 tablet (80 mg) by mouth daily 90 tablet 4     cholecalciferol (D 2000) 2000 UNITS tablet Take 2,000 Units by mouth.       cyclobenzaprine (FLEXERIL) 5 MG tablet 1-2 tablets three times daily as needed 30 tablet 1     HYDROcodone-acetaminophen (NORCO) 5-325 MG tablet Take 1 tablet by mouth 3 times daily as needed for pain 30 tablet 0     lisinopril (PRINIVIL/ZESTRIL) 40 MG tablet Take 1 tablet (40 mg) by mouth daily 90 tablet 4     Multiple Vitamins-Minerals (UNICAP-M PO) Take  by mouth.       Omega-3 1000 MG CAPS Take 2,000 mg by mouth.       omeprazole (PRILOSEC) 20 MG DR capsule Take 1 capsule (20 mg) by mouth daily 90 capsule 4     Flecainide and Simvastatin      Review of Systems:  Review of Systems  See HPI   objective:   /80 (BP Location: Right arm, Patient Position: Sitting,  Cuff Size: Adult Regular)   Pulse 76   Temp 97.7  F (36.5  C) (Tympanic)   Resp 18   Wt 99.5 kg (219 lb 4.8 oz)   SpO2 97%   BMI 28.16 kg/m    Physical Exam  Pleasant gentleman in no acute pain with palpation to the lumbosacral spine.  There is exquisite tenderness over the right sacroiliac joint.  He has normal strength and sensation of lower extremities.  No pain with palpation to right trochanter bursa.    Assessment:    ICD-10-CM    1. SI (sacroiliac) joint dysfunction M53.3 XR Lumbar Spine 2/3 Views     XR Sacroiliac Joint G/E 3 Views     PHYSICAL THERAPY REFERRAL     HYDROcodone-acetaminophen (NORCO) 5-325 MG tablet   2. Acute right-sided low back pain with right-sided sciatica M54.41 XR Lumbar Spine 2/3 Views     XR Sacroiliac Joint G/E 3 Views     PHYSICAL THERAPY REFERRAL     HYDROcodone-acetaminophen (NORCO) 5-325 MG tablet       Plan:   He has exquisite right sacroiliac joint tenderness.  He has no lumbosacral tenderness.  He has radicular symptoms down the right lateral leg and at the buttock.  Will obtain x-ray of sacroiliac joint and lumbosacral spine.  Refill provided of Lortab 5/325 mg to use sparingly.  Side effects including addiction reviewed and discussed with patient he expresses understanding.  He may use the Flexeril if he is having muscle spasms however recommend that he not take more than a total of 10 mg at a time.  He may continue to use the ibuprofen but would recommend reducing down to 400 mg as long as it is not bothering his stomach and if so he needs to discontinue that.  Referred him to physical therapy.  If he is not showing improvement with physical therapy within the next 2-3 weeks or develops any worsening especially symptoms of neurologic compromise which are reviewed and discussed that he needs to be seen.  If at that point he is not responding to physical therapy and current treatment plan then likely would get MRI of the bilateral sacroiliac joints and lumbar spine.   Also has not had screening PSA in quite some time, may want to consider this at follow-up appointment.    CALI Davidson   5/21/2019  1:33 PM

## 2019-05-30 ENCOUNTER — HOSPITAL ENCOUNTER (OUTPATIENT)
Dept: PHYSICAL THERAPY | Facility: OTHER | Age: 69
Setting detail: THERAPIES SERIES
End: 2019-05-30
Attending: NURSE PRACTITIONER
Payer: MEDICARE

## 2019-05-30 DIAGNOSIS — M54.41 ACUTE RIGHT-SIDED LOW BACK PAIN WITH RIGHT-SIDED SCIATICA: ICD-10-CM

## 2019-05-30 DIAGNOSIS — M53.3 SI (SACROILIAC) JOINT DYSFUNCTION: ICD-10-CM

## 2019-05-30 PROCEDURE — 97110 THERAPEUTIC EXERCISES: CPT | Mod: GP | Performed by: PHYSICAL THERAPIST

## 2019-05-30 PROCEDURE — 97161 PT EVAL LOW COMPLEX 20 MIN: CPT | Mod: GP | Performed by: PHYSICAL THERAPIST

## 2019-05-30 NOTE — PROGRESS NOTES
"   05/30/19 1500   General Information   Type of Visit Initial OP Ortho PT Evaluation   Start of Care Date 05/30/19   Referring Physician CECILE Rolle   Patient/Family Goals Statement reduce pain & improve function   Orders Evaluate and Treat   Date of Order 05/21/19   Certification Required? Yes   Medical Diagnosis SI (sacroiliac) joint dysfunction M53.3; Acute right-sided low back pain with right-sided sciatica M54.41    Surgical/Medical history reviewed Yes   Precautions/Limitations no known precautions/limitations   Weight-Bearing Status - LLE full weight-bearing   Weight-Bearing Status - RLE full weight-bearing   General Information Comments please refer to pt's medical record for any additional information   Body Part(s)   Body Part(s) Lumbar Spine/SI   Presentation and Etiology   Pertinent history of current problem (include personal factors and/or comorbidities that impact the POC) He has had on/off back pain in the past but for the past few weeks the pain has been sticking around and \"won't let go\"    Impairments B. Decreased WB tolerance;F. Decreased strength and endurance;G. Impaired balance;H. Impaired gait;A. Pain   Functional Limitations perform activities of daily living;perform desired leisure / sports activities   Symptom Location low back, R>L   How/Where did it occur From insidious onset   Onset date of current episode/exacerbation 05/12/19   Chronicity Chronic   Pain rating (0-10 point scale) Best (/10);Worst (/10)   Best (/10) 4   Worst (/10) 10   Pain quality A. Sharp;B. Dull;C. Aching;E. Shooting;F. Stabbing   Frequency of pain/symptoms A. Constant   Pain/symptoms exacerbated by B. Walking;G. Certain positions;J. ADL;K. Home tasks   Pain/symptoms eased by F. Certain positions;E. Changing positions;C. Rest;A. Sitting   Progression of symptoms since onset: Improved   Prior Level of Function   Prior Level of Function-Mobility Ind   Prior Level of Function-ADLs Ind   Current Level of Function "   Current Community Support Family/friend caregiver   Patient role/employment history A. Employed  (part time)   Employment Comments real estate   Living environment House/townhome   Current equipment-Gait/Locomotion None   Current equipment-ADL None   Fall Risk Screen   Fall screen completed by PT   Have you fallen 2 or more times in the past year? Yes   Have you fallen and had an injury in the past year? No   Is patient a fall risk? Department fall risk interventions implemented;No   Fall screen comments Pt states he was able to catch himself both times. They were not serious falls. States due to weakness in his legs   Abuse Screen (yes response referral indicated)   Feels Unsafe at Home or Work/School no   Feels Threatened by Someone no   Does Anyone Try to Keep You From Having Contact with Others or Doing Things Outside Your Home? no   Physical Signs of Abuse Present no   Functional Scales   Functional Scales Other   Other Scales  PSFS   Lumbar Spine/SI Objective Findings   Observation pt is pleasant and in no acute distress   Gait/Locomotion walks with caution favoring R LE, most likely due to LE weakness and not pain   Flexion ROM not accurately assessed due to muscle guarding   Hip Screen normal range.   Hip Abduction Strength 4/5 R, 5/5 L   Hip Extension Strength 4/5 R, 5/5 L   Knee Flexion Strength 4/5 R, 5/5 L   Knee Extension (L3) Strength 4/5 R, 5/5 L   Hamstring Flexibility normal   Hip Flexor Flexibility normal   Quadricep Flexibility normal   Slump Test NEG   Spring Test NEG   Lumbar/SI Special Tests Comments POS Trendelenberg on R   Planned Therapy Interventions   Planned Therapy Interventions joint mobilization;manual therapy;motor coordination training;neuromuscular re-education;strengthening;stretching   Planned Modality Interventions   Planned Modality Interventions Cryotherapy;Electrical stimulation;Ultrasound   Clinical Impression   Criteria for Skilled Therapeutic Interventions Met yes,  treatment indicated   PT Diagnosis LE weakness, SI pain R>L   Influenced by the following impairments LE weakness   Functional limitations due to impairments ambulating, prolonged standing   Clinical Presentation Stable/Uncomplicated   Clinical Decision Making (Complexity) Low complexity   Therapy Frequency 2 times/Week   Predicted Duration of Therapy Intervention (days/wks) 8 weeks   Risk & Benefits of therapy have been explained Yes   Patient, Family & other staff in agreement with plan of care Yes   Education Assessment   Preferred Learning Style Listening;Reading;Demonstration;Pictures/video   Barriers to Learning No barriers   ORTHO GOALS   PT Ortho Eval Goals 1;2;3   Ortho Goal 1   Goal Identifier Pain   Goal Description Pt to report a max pain level of 2/10 throughout the day for improved ADLs   Target Date 07/25/19   Ortho Goal 2   Goal Identifier LE strength   Goal Description Pt to have R LE strength comparable to L LE strength for improved strength and endurance   Target Date 07/25/19   Ortho Goal 3   Goal Identifier Wellness   Goal Description Pt to be able to sleep more in this bed vs recliner for improved sleep quality and improved wellness   Target Date 07/25/19   Total Evaluation Time   PT Eval, Low Complexity Minutes (13068) 20   Therapy Certification   Certification date from 05/30/19   Certification date to 07/25/19   Medical Diagnosis SI (sacroiliac) joint dysfunction M53.3; Acute right-sided low back pain with right-sided sciatica M54.41

## 2019-05-30 NOTE — PROGRESS NOTES
[facility]          OUTPATIENT PHYSICAL THERAPY ORTHOPEDIC EVALUATION  PLAN OF TREATMENT FOR OUTPATIENT REHABILITATION  (COMPLETE FOR INITIAL CLAIMS ONLY)  Patient's Last Name, First Name, M.I.  YOB: 1950  Past,Cedric HUA    Provider s Name:  [facility]   Medical Record No.  [id number]   Start of Care Date:  05/30/19   Onset Date:  05/12/19   Type:     _X__PT   ___OT   ___SLP Medical Diagnosis:  SI (sacroiliac) joint dysfunction M53.3; Acute right-sided low back pain with right-sided sciatica M54.41      PT Diagnosis:  (P) LE weakness, SI pain R>L   Visits from SOC:  1      _________________________________________________________________________________  Plan of Treatment/Functional Goals:  joint mobilization, manual therapy, motor coordination training, neuromuscular re-education, strengthening, stretching     Cryotherapy, Electrical stimulation, Ultrasound     Goals  Goal Identifier: (P) Pain  Goal Description: (P) Pt to report a max pain level of 2/10 throughout the day for improved ADLs  Target Date: (P) 07/25/19    Goal Identifier: (P) LE strength  Goal Description: (P) Pt to have R LE strength comparable to L LE strength for improved strength and endurance  Target Date: (P) 07/25/19    Goal Identifier: (P) Wellness  Goal Description: (P) Pt to be able to sleep more in this bed vs recliner for improved sleep quality and improved wellness  Target Date: (P) 07/25/19    Therapy Frequency:  (P) 2 times/Week  Predicted Duration of Therapy Intervention:  (P) 8 weeks    Felipe Michelle, PT                 I CERTIFY THE NEED FOR THESE SERVICES FURNISHED UNDER        THIS PLAN OF TREATMENT AND WHILE UNDER MY CARE     (Physician co-signature of this document indicates review and certification of the therapy plan).                       Certification Date From:  05/30/19   Certification Date To:  07/25/19    Referring Provider:  CECILE Elizalde  Assessment        See Epic Evaluation Start of Care Date: 05/30/19

## 2019-06-05 NOTE — ADDENDUM NOTE
Encounter addended by: Felipe Michelle, PT on: 6/5/2019 2:10 PM   Actions taken: Sign clinical note, Episode resolved

## 2019-06-05 NOTE — PROGRESS NOTES
Outpatient Physical Therapy Discharge Note     Patient: Cedric HUA Past  : 1950    Beginning/End Dates:  19    Referring Provider: CECILE Smalls Diagnosis: SI (sacroiliac) joint dysfunction M53.3; Acute right-sided low back pain with right-sided sciatica M54.41      Plan:  Discharge from therapy.    Discharge:   Pt called and cancelled his remaining PT sessions stating that he saw an ortho dr about his knee and he is going to focus on that for the time being and not his back. Please refer to pt's chart for most recent information on objective measurements, goals or any additional information. This is an unplanned DC    Reason for Discharge: pt called and cancelled his remaining PT appointments.     Discharge Plan: Patient to continue home program.

## 2019-06-12 ENCOUNTER — NURSE TRIAGE (OUTPATIENT)
Dept: INTERNAL MEDICINE | Facility: OTHER | Age: 69
End: 2019-06-12

## 2019-06-12 NOTE — TELEPHONE ENCOUNTER
"S - Right Knee pain    B - Saw Alyssia oRlle on 5-14-19 for pain in back and knees after moving logs. Cortisone injection to back helped, did not help with knee.     A - Pain 5 out of 10 right now. No pain medication used. Constant achy feeling, makes walking and activities difficult. Feels like searing pain in front patella at times. Lack of sensation/numbness to knee when touched. Slight swelling, does not look red or feel warm. Right leg did go out under him 3-4 times in last month, causing falls. No lumps in calf or SOB.  Back pain has improved.    R - Patient should be seen in office soon, preferred to see PCP since he has already seen other providers and feels like this is worse, not better. Wants her advice. Brielle Bonilla RN on 6/12/2019 at 2:51 PM      Additional Information    MODERATE pain (e.g., symptoms interfere with work or school, limping) and present > 3 days    Answer Assessment - Initial Assessment Questions  1. LOCATION and RADIATION: \"Where is the pain located?\"       Right knee  2. QUALITY: \"What does the pain feel like?\"  (e.g., sharp, dull, aching, burning)      It differs, achy with walking and numb at times with rest. Also gets occasional searing/burning in front patella are  3. SEVERITY: \"How bad is the pain?\" \"What does it keep you from doing?\"   (Scale 1-10; or mild, moderate, severe)    -  MILD (1-3): doesn't interfere with normal activities     -  MODERATE (4-7): interferes with normal activities (e.g., work or school) or awakens from sleep, limping     -  SEVERE (8-10): excruciating pain, unable to do any normal activities, unable to walk      5 out of 10, does limit his ability to walk  4. ONSET: \"When did the pain start?\" \"Does it come and go, or is it there all the time?\"      About a month ago with lifting logs on property, bothering constantly since.    5. RECURRENT: \"Have you had this pain before?\" If so, ask: \"When, and what happened then?\"      Not to this knee, hx of " "back pain  6. SETTING: \"Has there been any recent work, exercise or other activity that involved that part of the body?\"       Activity with lifting of logs set it off  7. AGGRAVATING FACTORS: \"What makes the knee pain worse?\" (e.g., walking, climbing stairs, running)      Walking and activity. Right leg did give out 3 -4 times in last month and he fell.   8. ASSOCIATED SYMPTOMS: \"Is there any swelling or redness of the knee?\"      Mild swelling to right, Feels numb to the touch, no redness or warmth.   9. OTHER SYMPTOMS: \"Do you have any other symptoms?\" (e.g., chest pain, difficulty breathing, fever, calf pain)      None  10. PREGNANCY: \"Is there any chance you are pregnant?\" \"When was your last menstrual period?\"        no    Protocols used: KNEE PAIN-A-OH    "

## 2019-06-13 ENCOUNTER — OFFICE VISIT (OUTPATIENT)
Dept: INTERNAL MEDICINE | Facility: OTHER | Age: 69
End: 2019-06-13
Attending: INTERNAL MEDICINE
Payer: COMMERCIAL

## 2019-06-13 VITALS
BODY MASS INDEX: 26.39 KG/M2 | OXYGEN SATURATION: 99 % | DIASTOLIC BLOOD PRESSURE: 86 MMHG | TEMPERATURE: 97.9 F | HEIGHT: 74 IN | SYSTOLIC BLOOD PRESSURE: 120 MMHG | RESPIRATION RATE: 18 BRPM | HEART RATE: 69 BPM | WEIGHT: 205.6 LBS

## 2019-06-13 DIAGNOSIS — M17.11 PRIMARY OSTEOARTHRITIS OF RIGHT KNEE: ICD-10-CM

## 2019-06-13 DIAGNOSIS — S83.241A TEAR OF MEDIAL MENISCUS OF RIGHT KNEE, CURRENT, UNSPECIFIED TEAR TYPE, INITIAL ENCOUNTER: ICD-10-CM

## 2019-06-13 DIAGNOSIS — M25.561 ACUTE PAIN OF RIGHT KNEE: Primary | ICD-10-CM

## 2019-06-13 DIAGNOSIS — M25.361 INSTABILITY OF RIGHT KNEE JOINT: ICD-10-CM

## 2019-06-13 PROCEDURE — G0463 HOSPITAL OUTPT CLINIC VISIT: HCPCS

## 2019-06-13 PROCEDURE — 99213 OFFICE O/P EST LOW 20 MIN: CPT | Performed by: INTERNAL MEDICINE

## 2019-06-13 RX ORDER — HYDROCODONE BITARTRATE AND ACETAMINOPHEN 5; 325 MG/1; MG/1
1 TABLET ORAL 3 TIMES DAILY PRN
Qty: 21 TABLET | Refills: 0 | Status: SHIPPED | OUTPATIENT
Start: 2019-06-13 | End: 2019-07-22

## 2019-06-13 RX ORDER — CYCLOBENZAPRINE HCL 5 MG
TABLET ORAL
Qty: 30 TABLET | Refills: 1 | Status: CANCELLED | OUTPATIENT
Start: 2019-06-13

## 2019-06-13 RX ORDER — HYDROCODONE BITARTRATE AND ACETAMINOPHEN 5; 325 MG/1; MG/1
1 TABLET ORAL 3 TIMES DAILY PRN
Qty: 30 TABLET | Refills: 0 | Status: CANCELLED | OUTPATIENT
Start: 2019-06-13

## 2019-06-13 ASSESSMENT — MIFFLIN-ST. JEOR: SCORE: 1772.35

## 2019-06-13 ASSESSMENT — PAIN SCALES - GENERAL: PAINLEVEL: MODERATE PAIN (4)

## 2019-06-13 NOTE — PROGRESS NOTES
"Chief Complaint   Patient presents with     Knee Pain     Right knee and leg          Subjective:   Mr. Colin is a 69 year old male  seen for the acute concern today of ongoing issues with knee pain.    Patient reports that he was lifting some logs and working in the yard on May 12.  He felt some back pain and right leg pain at the time.  He came in and was seen shortly after this and evaluated for back issues.  It did show some degenerative disease with some progression of the spondylolisthesis.  He was sent for some physical therapy which he did once.  He was also provided some Flexeril and hydrocodone.  He has found that these have been beneficial although he does have some side effects from the Flexeril.  Since he was originally seen he also has seen orthopedics in [place].  He had an injection in the trochanteric bursa which did help locally however did not help his knee.  He then saw a different orthopedic doctor who gave him an injection of the knee.  He had some initial improvement for a day or 2 but then had worsening of his knee pain again.  He has been having some issues sleeping because of this.  He did have various x-rays done which are reviewed today.  He had an x-ray of his low back, SI joint, hip joint and knee all on the right side. Degenerative disease was seen but no other significant abnormality.    Since all this started he has had the knee give out on him at least 4 times.  He may have hit it again when he fell on the ground at one point.  He has not been icing it regularly.    He  reports that he quit smoking about 39 years ago. His smoking use included cigarettes. He has a 15.00 pack-year smoking history. He has never used smokeless tobacco.    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     History of varicose veins      Hyperlipidemia      Impingement syndrome of left shoulder 10/28/2015     Paroxysmal atrial fibrillation (H)     ongoing despite s/p multiple ablation with Dr. Ivey     Pericarditis 2011    Inflammatory, treated with anti-inflammatories, with pericardial effusion. Dr. Zheng     Primary osteoarthritis of shoulder 10/28/2015     Vitamin D deficiency    .      ROS:   Pertinent  ROS was performed and was negative, including for fevers, chills, increased lower extremity edema. No other concerns, with exception of HPI above.      Objective:    /86 (BP Location: Right arm, Patient Position: Sitting, Cuff Size: Adult Regular)   Pulse 69   Temp 97.9  F (36.6  C) (Tympanic)   Resp 18   Ht 1.88 m (6' 2\")   Wt 93.3 kg (205 lb 9.6 oz)   SpO2 99%   BMI 26.40 kg/m    GEN: Vitals reviewed.  Patient is in no acute distress. Cooperative with exam.  HEENT: Normocephalic atraumatic.  Pupils equally round.  No scleral icterus, no conjunctival erythema.   SKIN: Warm and dry to touch.  No rash on face, arms and legs.  EXT: No clubbing or cyanosis.  No peripheral edema.  Minimal swelling is noted around the knee.  He does have some pain to palpation on the joint line medially of the right knee.  He also has some discomfort subpatellar.  He does have stiffness with standing and walks with a cane today.     Assessment/Plan:   Acute pain of right knee  -At this time given his acute right knee pain, instability we will pursue MRI of the knee.  He is provided with a short course of pain medications although cautioned to use these sparingly.  He can use Tylenol in between to help with the discomfort.  He is to be icing the knee on a regular basis.  We will call with results and consider referral as " appropriate.  - MR Knee Right w/o Contrast  - HYDROcodone-acetaminophen (NORCO) 5-325 MG tablet  Dispense: 21 tablet; Refill: 0    Instability of right knee joint  - MR Knee Right w/o Contrast  - HYDROcodone-acetaminophen (NORCO) 5-325 MG tablet  Dispense: 21 tablet; Refill: 0      - Return/call as needed for follow-up should any new symptoms develop, for worsening of current symptoms or if symptoms do not resolve with above plan.    Return if symptoms worsen or fail to improve.     PERRY GU, DO   6/13/2019 6:57 AM    This document was prepared using voice generated softwear. While every attempt was made for accuracy, grammatical errors may exist.    ADDENDUM:  6/14/2019   4:28 PM    MRI shows meniscal tear.  Referral placed for orthopedics.  Patient would like to be seen soonest available at [facility].    Perry Gu

## 2019-06-13 NOTE — NURSING NOTE
Patient presents to clinic with right knee and leg pain.    Adelaida Bruce LPN on 6/13/2019 at 3:04 PM

## 2019-06-13 NOTE — PATIENT INSTRUCTIONS
Ice every 3-4 hours, gentle exercise/rest as much as possible.    Caution with NSAIDS (ibuprofen, aspirin, naproxen, aleve, advil) due to risk for increased blood pressure,stomach pain/nausea/ulcers and kidney damage; use minimal amount necessary    Ok to take Tylenol 1000mg (2- extra strength 500mg tablets or 3 regular strength 325mg tablets) three times a day as needed for pain; Maximum of 4000mg daily    - Return/call as needed for follow-up should any new symptoms develop, for worsening of current symptoms or if symptoms do notresolve with above plan.

## 2019-06-14 ENCOUNTER — HOSPITAL ENCOUNTER (OUTPATIENT)
Dept: MRI IMAGING | Facility: OTHER | Age: 69
Discharge: HOME | End: 2019-06-14
Attending: INTERNAL MEDICINE | Admitting: INTERNAL MEDICINE
Payer: MEDICARE

## 2019-06-14 ENCOUNTER — TELEPHONE (OUTPATIENT)
Dept: INTERNAL MEDICINE | Facility: OTHER | Age: 69
End: 2019-06-14

## 2019-06-14 DIAGNOSIS — M25.561 ACUTE PAIN OF RIGHT KNEE: ICD-10-CM

## 2019-06-14 DIAGNOSIS — M25.361 INSTABILITY OF RIGHT KNEE JOINT: ICD-10-CM

## 2019-06-14 PROCEDURE — 73721 MRI JNT OF LWR EXTRE W/O DYE: CPT | Mod: RT

## 2019-06-17 ENCOUNTER — TELEPHONE (OUTPATIENT)
Dept: INTERNAL MEDICINE | Facility: OTHER | Age: 69
End: 2019-06-17

## 2019-06-17 NOTE — TELEPHONE ENCOUNTER
Pt states that he would like to change his referral to another facility as he is not able to get in to his current referral until the end of July and doesn't think that he can wait that long. Pt states that he left a voicemail for the referral desk but has not heard anything back .

## 2019-06-17 NOTE — TELEPHONE ENCOUNTER
Patient had left a message with [facility] and was called back to discuss referral.  He made his own appointment on 6.21.2019 with Dr. Severson at [facility].  His referral has been sent to Dr. Severson's office.  Patient's MRI was requested and he will hand carry it to his appointment this Friday.  Eusebia Renee on 6/17/2019 at 2:49 PM

## 2019-07-22 ENCOUNTER — OFFICE VISIT (OUTPATIENT)
Dept: PEDIATRICS | Facility: OTHER | Age: 69
End: 2019-07-22
Attending: INTERNAL MEDICINE
Payer: COMMERCIAL

## 2019-07-22 VITALS
RESPIRATION RATE: 16 BRPM | HEIGHT: 74 IN | BODY MASS INDEX: 26.69 KG/M2 | TEMPERATURE: 97.5 F | SYSTOLIC BLOOD PRESSURE: 130 MMHG | DIASTOLIC BLOOD PRESSURE: 80 MMHG | OXYGEN SATURATION: 95 % | HEART RATE: 76 BPM | WEIGHT: 208 LBS

## 2019-07-22 DIAGNOSIS — R20.2 NUMBNESS AND TINGLING OF LOWER EXTREMITY: ICD-10-CM

## 2019-07-22 DIAGNOSIS — S83.206D TEAR OF MENISCUS OF RIGHT KNEE AS CURRENT INJURY, UNSPECIFIED MENISCUS, UNSPECIFIED TEAR TYPE, SUBSEQUENT ENCOUNTER: ICD-10-CM

## 2019-07-22 DIAGNOSIS — M43.16 SPONDYLOLISTHESIS OF LUMBAR REGION: ICD-10-CM

## 2019-07-22 DIAGNOSIS — M51.369 DDD (DEGENERATIVE DISC DISEASE), LUMBAR: ICD-10-CM

## 2019-07-22 DIAGNOSIS — M48.061 LUMBAR FORAMINAL STENOSIS: ICD-10-CM

## 2019-07-22 DIAGNOSIS — M47.816 LUMBAR FACET ARTHROPATHY: ICD-10-CM

## 2019-07-22 DIAGNOSIS — R20.0 NUMBNESS AND TINGLING OF LOWER EXTREMITY: ICD-10-CM

## 2019-07-22 DIAGNOSIS — R29.898 WEAKNESS OF RIGHT LOWER EXTREMITY: ICD-10-CM

## 2019-07-22 DIAGNOSIS — M54.16 LUMBAR RADICULOPATHY: Primary | ICD-10-CM

## 2019-07-22 PROCEDURE — 99214 OFFICE O/P EST MOD 30 MIN: CPT | Performed by: INTERNAL MEDICINE

## 2019-07-22 PROCEDURE — G0463 HOSPITAL OUTPT CLINIC VISIT: HCPCS

## 2019-07-22 RX ORDER — HYDROCODONE BITARTRATE AND ACETAMINOPHEN 5; 325 MG/1; MG/1
1 TABLET ORAL 3 TIMES DAILY PRN
Qty: 21 TABLET | Refills: 0 | Status: SHIPPED | OUTPATIENT
Start: 2019-07-22 | End: 2019-10-11

## 2019-07-22 ASSESSMENT — PAIN SCALES - GENERAL: PAINLEVEL: SEVERE PAIN (6)

## 2019-07-22 ASSESSMENT — MIFFLIN-ST. JEOR: SCORE: 1783.23

## 2019-07-22 NOTE — PROGRESS NOTES
Subjective  Cedric Colin is a 69 year old male who presents for back pain.  Started May 12, 2019.  He was trying to move some logs that were from a tree that was cut down by [employer].  After he was done he sat down and could not get back up.  He saw a Alyssia Rolle and was referred to physical therapy.  He did 3 sessions.  He then went to [facility] in [place] and saw a nurse practitioner there.  He underwent a hip injection in the bursa.  He then saw Dr. Watters who did a knee injection.  The knee injection lasted 2 weeks.  Subsequently went in for an MRI of the knee and was referred to Dr. Ayala of orthopedic surgery.  At that time an MRI was obtained of the back.  He is awaiting an injection of the back.  He has pain in both the right knee as well as right upper outer buttock.  He feels like there is 2 different pains in the knee one is more of an aching and one is a tingling sensation waterfalls down the knee.  He does have some lack of sensation over the anterior right shin area.  No subsequent fall.  He is to get around fine with a cane now is using a walker.  No saddle anesthesia.  No fecal or urinary incontinence.  He feels like there is a slight progressive weakness in his right lower extremity.    Problem List/PMH: reviewed in EMR, and made relevant updates today.  Medications: reviewed in EMR, and made relevant updates today.  Allergies: reviewed in EMR, and made relevant updates today.    Social Hx:  Social History     Tobacco Use     Smoking status: Former Smoker     Packs/day: 1.50     Years: 10.00     Pack years: 15.00     Types: Cigarettes     Last attempt to quit: 1980     Years since quittin.5     Smokeless tobacco: Never Used   Substance Use Topics     Alcohol use: No     Alcohol/week: 0.0 oz     Comment: Alcoholic Drinks/day: rarely     Drug use: No     Comment: No IV drug use     Social History     Social History Narrative    , Sita Bolanos, elizabeth real  "estate some. 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  Exercise, jogs regularly with his dog.  Lives on [facility] [address] [facility].  Wrote a novel, and book of poems.     I reviewed social history and made relevant updates today.    Family Hx:   Family History   Problem Relation Age of Onset     Heart Disease Mother 89        Heart Disease, possible heart disease     Other - See Comments Father 95        macular degeneration/kidney removed     No Known Problems Sister      No Known Problems Brother      Heart Disease Maternal Uncle         Heart Disease,2  heart disease     Colon Cancer No family hx of         Cancer-colon     Prostate Cancer No family hx of         Cancer-prostate       Objective  Vitals: reviewed in EMR.  /80 (BP Location: Right arm, Patient Position: Sitting, Cuff Size: Adult Large)   Pulse 76   Temp 97.5  F (36.4  C) (Tympanic)   Resp 16   Ht 1.88 m (6' 2\")   Wt 94.3 kg (208 lb)   SpO2 95%   BMI 26.71 kg/m      Gen: Pleasant male, NAD.  HEENT: MMM  Neck: Supple  Pulm: Breathing easily  Neuro: Strength is reduced in the right ankle 4+/5.  Back: Mild tenderness to palpation in the right upper outer buttock.  No midline tenderness to palpation.  Skin: No concerning lesions.  Psychiatric: Normal affect and insight. Does not appear anxious or depressed.    MRI knee report dated 2019 was personally reviewed with the patient today.    Lumbar MRI report reviewed via care everywhere dated 2019 was personally reviewed with the patient today.    Assessment    ICD-10-CM    1. Lumbar radiculopathy M54.16 NEUROSURGERY REFERRAL   2. DDD (degenerative disc disease), lumbar M51.36 NEUROSURGERY REFERRAL   3. Lumbar facet arthropathy M47.816 NEUROSURGERY REFERRAL   4. Spondylolisthesis of lumbar region M43.16 NEUROSURGERY REFERRAL   5. Lumbar foraminal stenosis M99.83 NEUROSURGERY REFERRAL     HYDROcodone-acetaminophen (NORCO) 5-325 MG tablet   6. Weakness of right lower extremity R29.898 " NEUROSURGERY REFERRAL   7. Numbness and tingling of lower extremity R20.0 NEUROSURGERY REFERRAL    R20.2    8. Tear of meniscus of right knee as current injury, unspecified meniscus, unspecified tear type, subsequent encounter S83.206D HYDROcodone-acetaminophen (NORCO) 5-325 MG tablet     Orders Placed This Encounter   Procedures     NEUROSURGERY REFERRAL       While he certainly has some discomfort related to his right knee with the findings of meniscal injury and osteoarthritis I believe the more significant components are coming from his low back.  Certainly he has significant cause for discomfort in the foraminal stenoses, facet arthropathy, spondylolisthesis, etc.  His most significant pain is in a L4 distribution on the right.  Based on my review of his clinical history, exam and imaging I think it is in his best interest to see a neurosurgeon to consider expediting a spinal procedure.  I am not convinced that an injection would be helpful at this point.  We also discussed the possibility of physical therapy and deferred for this point in time.  Warning signs were discussed and prompt repeat evaluation recommended if they develop.    Plan   -- Expected clinical course discussed   -- Medications and their side effects discussed  Patient Instructions    -- Ibuprofen 600 mg (3 tablets) 3 times per day for 7 days, then as needed   -- Take ibuprofen with food, as can be hard on the stomach   -- Rest   -- Ice/heat whichever feels better   -- Topicals: Aspercreme/IcyHot, lidocaine, capsaicin   -- Hydrocodone/apap rarely for severe pain   -- MiraLax as needed for constipation, take a dose if you use the strong pain pill   -- Schedule visit with Neurosurgery          Return if symptoms worsen or fail to improve.    Signed, David Medina MD  Internal Medicine & Pediatrics

## 2019-07-22 NOTE — PATIENT INSTRUCTIONS
-- Ibuprofen 600 mg (3 tablets) 3 times per day for 7 days, then as needed   -- Take ibuprofen with food, as can be hard on the stomach   -- Rest   -- Ice/heat whichever feels better   -- Topicals: Aspercreme/IcyHot, lidocaine, capsaicin   -- Hydrocodone/apap rarely for severe pain   -- MiraLax as needed for constipation, take a dose if you use the strong pain pill   -- Schedule visit with Neurosurgery

## 2019-07-22 NOTE — NURSING NOTE
Patient presents to clinic for ongoing back pain that has been going on since May 2019.  Has had MRI of back.  Monica Longo LPN ....................  7/22/2019   3:26 PM      No LMP for male patient.  Medication Reconciliation: complete    Monica Longo LPN  7/22/2019 3:26 PM

## 2019-07-30 ENCOUNTER — MEDICAL CORRESPONDENCE (OUTPATIENT)
Dept: HEALTH INFORMATION MANAGEMENT | Facility: OTHER | Age: 69
End: 2019-07-30

## 2019-08-02 ENCOUNTER — HOSPITAL ENCOUNTER (OUTPATIENT)
Dept: GENERAL RADIOLOGY | Facility: OTHER | Age: 69
Discharge: HOME | End: 2019-08-02
Attending: ORTHOPAEDIC SURGERY | Admitting: FAMILY MEDICINE
Payer: MEDICARE

## 2019-08-02 DIAGNOSIS — M51.26 HERNIATED NUCLEUS PULPOSUS, LUMBAR: ICD-10-CM

## 2019-08-02 PROCEDURE — 64483 NJX AA&/STRD TFRM EPI L/S 1: CPT

## 2019-08-02 PROCEDURE — 25500064 ZZH RX 255 OP 636: Performed by: RADIOLOGY

## 2019-08-02 PROCEDURE — 25000128 H RX IP 250 OP 636: Performed by: RADIOLOGY

## 2019-08-02 PROCEDURE — 25000125 ZZHC RX 250: Performed by: RADIOLOGY

## 2019-08-02 RX ORDER — DEXAMETHASONE SODIUM PHOSPHATE 10 MG/ML
10 INJECTION, SOLUTION INTRAMUSCULAR; INTRAVENOUS ONCE
Status: COMPLETED | OUTPATIENT
Start: 2019-08-02 | End: 2019-08-02

## 2019-08-02 RX ORDER — LIDOCAINE HYDROCHLORIDE 10 MG/ML
2 INJECTION, SOLUTION INFILTRATION; PERINEURAL ONCE
Status: COMPLETED | OUTPATIENT
Start: 2019-08-02 | End: 2019-08-02

## 2019-08-02 RX ORDER — LIDOCAINE HYDROCHLORIDE 10 MG/ML
2 INJECTION, SOLUTION EPIDURAL; INFILTRATION; INTRACAUDAL; PERINEURAL ONCE
Status: COMPLETED | OUTPATIENT
Start: 2019-08-02 | End: 2019-08-02

## 2019-08-02 RX ADMIN — LIDOCAINE HYDROCHLORIDE 2 ML: 10 INJECTION, SOLUTION EPIDURAL; INFILTRATION; INTRACAUDAL; PERINEURAL at 14:35

## 2019-08-02 RX ADMIN — DEXAMETHASONE SODIUM PHOSPHATE 10 MG: 10 INJECTION, SOLUTION INTRAMUSCULAR; INTRAVENOUS at 14:35

## 2019-08-02 RX ADMIN — IOHEXOL 2 ML: 240 INJECTION, SOLUTION INTRATHECAL; INTRAVASCULAR; INTRAVENOUS; ORAL at 14:35

## 2019-08-02 RX ADMIN — LIDOCAINE HYDROCHLORIDE 2 ML: 10 INJECTION, SOLUTION INFILTRATION; PERINEURAL at 14:35

## 2019-08-07 ENCOUNTER — TRANSFERRED RECORDS (OUTPATIENT)
Dept: HEALTH INFORMATION MANAGEMENT | Facility: OTHER | Age: 69
End: 2019-08-07

## 2019-08-08 DIAGNOSIS — M21.371 RIGHT FOOT DROP: Primary | ICD-10-CM

## 2019-08-26 ENCOUNTER — HOSPITAL ENCOUNTER (OUTPATIENT)
Dept: PHYSICAL THERAPY | Facility: OTHER | Age: 69
Setting detail: THERAPIES SERIES
End: 2019-08-26
Attending: ANESTHESIOLOGY
Payer: MEDICARE

## 2019-08-26 DIAGNOSIS — M21.371 RIGHT FOOT DROP: ICD-10-CM

## 2019-08-26 PROCEDURE — 97161 PT EVAL LOW COMPLEX 20 MIN: CPT | Mod: GP

## 2019-08-26 PROCEDURE — 97110 THERAPEUTIC EXERCISES: CPT | Mod: GP

## 2019-08-28 ENCOUNTER — HOSPITAL ENCOUNTER (OUTPATIENT)
Dept: PHYSICAL THERAPY | Facility: OTHER | Age: 69
Setting detail: THERAPIES SERIES
End: 2019-08-28
Attending: ANESTHESIOLOGY
Payer: MEDICARE

## 2019-08-28 PROCEDURE — 97140 MANUAL THERAPY 1/> REGIONS: CPT | Mod: GP

## 2019-08-28 PROCEDURE — 97110 THERAPEUTIC EXERCISES: CPT | Mod: GP

## 2019-08-28 NOTE — PROGRESS NOTES
[facility]          OUTPATIENT PHYSICAL THERAPY ORTHOPEDIC EVALUATION  PLAN OF TREATMENT FOR OUTPATIENT REHABILITATION  (COMPLETE FOR INITIAL CLAIMS ONLY)  Patient's Last Name, First Name, M.I.  YOB: 1950  Past,Cedric HUA    Provider s Name:  [facility]   Medical Record No.  [id number]   Start of Care Date:  08/26/19   Onset Date:  05/12/19   Type:     _X__PT   ___OT   ___SLP Medical Diagnosis:        PT Diagnosis:  Right LE radiculopathy/ Lumbar stenosis.   Visits from SOC:  1      _________________________________________________________________________________  Plan of Treatment/Functional Goals:  joint mobilization, manual therapy, ROM, strengthening, stretching     Ultrasound, Electrical stimulation, Cryotherapy, Hot packs, Traction     Goals  Goal Identifier: Pain  Goal Description: Patient will report he is able to walk as desired without limit due to right LE pain in excess of 2/10 VAS in 12 weeks  Target Date: 11/26/19    Goal Identifier: Mobility  Goal Description: Patient will demo full lumbar/pelvic and right LE mobility equal to left allowing movement required for completing yard and home maintenance tasks, walk with nml gait pattern in 12 weeks  Target Date: 11/26/19    Goal Identifier: Strength  Goal Description: Patient will demo right LE strength of 5/5 with MMT and functional strength of at least 80% right compared to left supporting nml gait on all surfaces as desired and the ability to complete tasks [id number] the home that require lifting and carrying such as yard work in 12 weeks  Target Date: 11/26/19                  Therapy Frequency:  2 times/Week  Predicted Duration of Therapy Intervention:  10-12 weeks    Jose Bernal, PT                 I CERTIFY THE NEED FOR THESE SERVICES FURNISHED UNDER        THIS PLAN OF TREATMENT AND WHILE UNDER MY CARE .             Physician Signature                Date    X_____________________________________________________                             Certification Date From:  08/26/19   Certification Date To:  11/26/19    Referring Provider:   Titus Ceron D.O.    Initial Assessment        See Epic Evaluation Start of Care Date: 08/26/19

## 2019-08-28 NOTE — PROGRESS NOTES
"   08/26/19 1400   General Information   Type of Visit Initial OP Ortho PT Evaluation   Start of Care Date 08/26/19   Referring Physician  Titus Ceron D.O.   Patient/Family Goals Statement To improve strength of right foot/ankle   Orders Evaluate and Treat   Date of Order 08/08/19   Certification Required? Yes   Surgical/Medical history reviewed Yes   Precautions/Limitations no known precautions/limitations   Weight-Bearing Status - RLE full weight-bearing   Body Part(s)   Body Part(s) Ankle/Foot;Lumbar Spine/SI   Presentation and Etiology   Pertinent history of current problem (include personal factors and/or comorbidities that impact the POC) Patient is a 70 y/o male whom presents to PT with complaint of transient  LBP/ RLE. He was referred for right foot drop but denies that this has been a problem.  Reports his back and leg pain began as result of lifting /moving some logs several weeks ago. Reports initially significant right leg and right knee pain. He was referred to orthopedics wher he recieved a cortisone injection in the right knee. Reports previous back pain that generally resolved over a short time. He was referred for PT and recieved 3 sessions of PT which did not give him relief. He eventually saw Dr. Severson whom referred him for a MRI of his back. This revealed  DDD and lumbar stenosis stenosis. He recieved an epidural spinal injection that also did not really give him any relief. He also recieved an injection in his right lateral hip which was helpful in reducing some of his leg pain. He was referred to Titus Ceron D.O whom prescribed gabapentin. This has \"helped him sleep\".  He now mainly c/o a \"tingling pain\" in his right knee to the right medial/anterior lower leg.  He reports only minimal back pain. Initially he was using a walker, he is now able to walk up to a half mile with a SEC w/o stopping. Pain is generally worse with walking and relieved with sitting. His past medical history is " unremarkable.    Impairments A. Pain;D. Decreased ROM;E. Decreased flexibility   Functional Limitations perform activities of daily living;perform required work activities;perform desired leisure / sports activities   Symptom Location Right knee, right anterior lower leg region.   How/Where did it occur With a fall   Onset date of current episode/exacerbation 05/12/19   Chronicity Chronic   Pain rating (0-10 point scale) Best (/10);Worst (/10)   Best (/10) varies from 0 to 5/10   Worst (/10) Intermittent up to a 9/10, daily.    Pain quality E. Shooting;C. Aching;A. Sharp   Frequency of pain/symptoms C. With activity   Pain/symptoms are: Worse during the day   Pain/symptoms exacerbated by B. Walking   Pain/symptoms eased by C. Rest;A. Sitting   Progression of symptoms since onset: Improved;Unchanged   Current / Previous Interventions   Diagnostic Tests: MRI   MRI Results Results   MRI results Lumbar stenosis, facet joint arthropathy, DDD   Prior Level of Function   Prior Level of Function-Mobility NML   Prior Level of Function-ADLs NML   Current Level of Function   Current Community Support Family/friend caregiver   Patient role/employment history F. Retired   Living environment [facility]   Fall Risk Screen   Fall screen completed by PT   Have you fallen 2 or more times in the past year? No   Have you fallen and had an injury in the past year? No   Is patient a fall risk? No   Abuse Screen (yes response referral indicated)   Feels Unsafe at Home or Work/School no   Feels Threatened by Someone no   Does Anyone Try to Keep You From Having Contact with Others or Doing Things Outside Your Home? no   Physical Signs of Abuse Present no   Functional Scales   Functional Scales Other   Other Scales  Oswestry 28.89   Ankle/Foot Objective Findings   Side (if bilateral, select both right and left) Right   Observation Walking with a SEC   Integumentary NML   Posture Normal   Gait/Locomotion Gait with SEC. No evidence of foot  drop/slap.   Balance/ Proprioception (Single Leg Stance) less than 10' right   Foot Position In Standing NML   Ankle/Foot ROM Comment All WFL   Ankle/Foot Strength Comments See lumbar evaluation   Palpation Diffuse tenderness 1-2/4  right anterior lower leg    Right DF (Knee Ext) AROM WFL   Right DF/Inversion Strength 3+   Right DF/Eversion Strength 3+   Right PF/Inversion Strength 4+   Right PF/Eversion Strength 4+   Right PF Strength 4+   Lumbar Spine/SI Objective Findings   Observation Patient is able to stand and walk on both toes and heels   Integumentary NML   Posture Reduced lumbar lordosis   Gait/Locomotion Appears NML. No evidence of foot drop or foot slap   Balance/Proprioception (Single Leg Stance) See above   Flexion ROM WFL to ankles   Extension ROM Slight limitation   Right Side Bending ROM WFL   Left Side Bending ROM WFL   Repeated Extension-Standing ROM To be tested next appointment   Repeated Flexion-Standing ROM As above   Pelvic Screen Note slight right ilium anterior rotation. Tests are negative for provoking pain   Hip Screen All negative   Transversus Abdominus Strength (Socrates Leg Lowering-deg) Fair+    Hip Flexion (L2) Strength 3+ R   Hip Abduction Strength  4 R   Hip Adduction Strength 5 R   Hip Extension Strength 4+ R   Knee Flexion Strength 4- R   Knee Extension (L3) Strength 4- R   Ankle Dorsiflexion (L4) Strength 4- R   Great Toe Extension (L5) Strength 3+ R   Lumbar/Hip/Knee/Foot Strength Comments Left LE strength all 5/5   Hamstring Flexibility B limitation   Hip Flexor Flexibility B minor Limitation   Piriformis Flexibility Limited right   SLR NEG   Crossover SLR NEG   Slump Test NEG   Sensation Testing Decreased light touch in L4 dermatome   Patellar Tendon Reflexes  Rduced right   Palpation Right knee medial joint line tenderness   Planned Therapy Interventions   Planned Therapy Interventions joint mobilization;manual therapy;ROM;strengthening;stretching   Planned Modality  Interventions   Planned Modality Interventions Ultrasound;Electrical stimulation;Cryotherapy;Hot packs;Traction   Clinical Impression   Criteria for Skilled Therapeutic Interventions Met yes, treatment indicated   PT Diagnosis Right LE radiculopathy/ Lumbar stenosis.   Influenced by the following impairments Pain, weakness and immobility   Functional limitations due to impairments gait dysfunction, inability to complete home/   Clinical Presentation Unstable/Unpredictable   Clinical Decision Making (Complexity) Moderate complexity   Therapy Frequency 2 times/Week   Predicted Duration of Therapy Intervention (days/wks) 10-12 weeks   Risk & Benefits of therapy have been explained Yes   Clinical Impression Comments Global pain complaints.Right knee and hip dysfunction underlying lumbar radiculopathy   Education Assessment   Preferred Learning Style Listening;Reading;Demonstration;Pictures/video   Barriers to Learning No barriers   ORTHO GOALS   PT Ortho Eval Goals 1;2;3   Ortho Goal 1   Goal Identifier Pain   Goal Description Patient will report he is able to walk as desired without limit due to right LE pain in excess of 2/10 VAS in 12 weeks   Target Date 11/26/19   Ortho Goal 2   Goal Identifier Mobility   Goal Description Patient will demo full lumbar/pelvic and right LE mobility equal to left allowing movement required for completing yard and home maintenance tasks, walk with nml gait pattern in 12 weeks   Target Date 11/26/19   Ortho Goal 3   Goal Identifier Strength   Goal Description Patient will demo right LE strength of 5/5 with MMT and functional strength of at least 80% right compared to left supporting nml gait on all surfaces as desired and the ability to complete tasks [id number] the home that require lifting and carrying such as yard work in 12 weeks   Target Date 11/26/19   Total Evaluation Time   PT Micheline, Low Complexity Minutes (82062) 17   Therapy Certification   Certification date from 08/26/19    Certification date to 11/26/19

## 2019-09-03 ENCOUNTER — HOSPITAL ENCOUNTER (OUTPATIENT)
Dept: PHYSICAL THERAPY | Facility: OTHER | Age: 69
Setting detail: THERAPIES SERIES
End: 2019-09-03
Attending: ANESTHESIOLOGY
Payer: MEDICARE

## 2019-09-03 PROCEDURE — 97140 MANUAL THERAPY 1/> REGIONS: CPT | Mod: GP

## 2019-09-03 PROCEDURE — 97110 THERAPEUTIC EXERCISES: CPT | Mod: GP

## 2019-09-09 ENCOUNTER — HOSPITAL ENCOUNTER (OUTPATIENT)
Dept: PHYSICAL THERAPY | Facility: OTHER | Age: 69
Setting detail: THERAPIES SERIES
End: 2019-09-09
Attending: ANESTHESIOLOGY
Payer: MEDICARE

## 2019-09-09 PROCEDURE — 97110 THERAPEUTIC EXERCISES: CPT | Mod: GP

## 2019-09-10 NOTE — PROGRESS NOTES
Outpatient Physical Therapy Discharge Note     Patient: Cedric HUA Past  : 1950    End Dates of Reporting Period:  2019    Referring Provider: Dr. Titus Guy    Therapy Diagnosis: LBP with right radiculopathy.      Client Self Report: Reports his leg is feeling much better. He will recieve a cortisone injection later this week. He does continue to  experience right LE N/T. Patient feels he will do well with his HEP.    Objective Measurements:  Objective Measure: Pain/Palpation  Details: Rates right leg pain as 2-3/10. No pain meds. Note tenderness 2/4 of right pes anserine. 2/4 tenderness of the medial joint line with relatively light palpation.   Objective Measure: Mobility  Details: Limited piriformis/HS  flex.   Objective Measure: strength  Details: Right LE strength has improved as follows. Hip flexion, knee extension/flexion are 4+/5. Right ankle inver/ever are 4/5, DF is 4+/5, PF is 4/5.               Goals:  Goal Identifier Pain   Goal Description Patient will report he is able to walk as desired without limit due to right LE pain in excess of 2/10 VAS in 12 weeks   Target Date 19   Date Met      Progress: Patient will continue a program to increase his walking distance and return to the [facility] for aerobic exercise     Goal Identifier Mobility   Goal Description Patient will demo full lumbar/pelvic and right LE mobility equal to left allowing movement required for completing yard and home maintenance tasks, walk with nml gait pattern in 12 weeks   Target Date 19   Date Met      Progress: Continue goal. Patient is doing well with his HEP stretches     Goal Identifier Strength   Goal Description Patient will demo right LE strength of 5/5 with MMT and functional strength of at least 80% right compared to left supporting nml gait on all surfaces as desired and the ability to complete tasks [id number] the home that require lifting and carrying such as yard work in 12 weeks   Target Date  11/26/19   Date Met      Progress: See measurements above         Progress Toward Goals:   Progress this reporting period: As above          Plan:  Discharge from therapy.    Discharge:    Reason for Discharge: Patient has made good progress toward all goals.    Equipment Issued: None    Discharge Plan: Patient to continue home program.

## 2019-09-29 ENCOUNTER — HEALTH MAINTENANCE LETTER (OUTPATIENT)
Age: 69
End: 2019-09-29

## 2019-10-10 ENCOUNTER — TELEPHONE (OUTPATIENT)
Dept: INTERNAL MEDICINE | Facility: OTHER | Age: 69
End: 2019-10-10

## 2019-10-10 NOTE — TELEPHONE ENCOUNTER
Called and spoke with patient after proper verification. Informed patient that PCP has no openings and defer see any available provider or go into [facility].     Glenda Thao LPN............. October 10, 2019 9:47 AM

## 2019-10-10 NOTE — TELEPHONE ENCOUNTER
Patient called for an appointment with you because his dog bit him    Please call with a work in or to advise    Thank you

## 2019-10-11 ENCOUNTER — OFFICE VISIT (OUTPATIENT)
Dept: FAMILY MEDICINE | Facility: OTHER | Age: 69
End: 2019-10-11
Attending: INTERNAL MEDICINE
Payer: COMMERCIAL

## 2019-10-11 VITALS
HEART RATE: 98 BPM | WEIGHT: 218 LBS | DIASTOLIC BLOOD PRESSURE: 76 MMHG | SYSTOLIC BLOOD PRESSURE: 134 MMHG | TEMPERATURE: 98.7 F | BODY MASS INDEX: 27.98 KG/M2 | RESPIRATION RATE: 16 BRPM | HEIGHT: 74 IN | OXYGEN SATURATION: 97 %
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
## 2019-10-11 DIAGNOSIS — M48.061 LUMBAR FORAMINAL STENOSIS: ICD-10-CM

## 2019-10-11 PROCEDURE — 99214 OFFICE O/P EST MOD 30 MIN: CPT | Performed by: NURSE PRACTITIONER

## 2019-10-11 PROCEDURE — G0463 HOSPITAL OUTPT CLINIC VISIT: HCPCS

## 2019-10-11 RX ORDER — PREDNISONE 20 MG/1
TABLET ORAL
Qty: 20 TABLET | Refills: 0 | Status: SHIPPED | OUTPATIENT
Start: 2019-10-11 | End: 2019-12-17

## 2019-10-11 RX ORDER — HYDROCODONE BITARTRATE AND ACETAMINOPHEN 5; 325 MG/1; MG/1
1 TABLET ORAL 3 TIMES DAILY PRN
Qty: 21 TABLET | Refills: 0 | Status: SHIPPED | OUTPATIENT
Start: 2019-10-11 | End: 2019-12-17

## 2019-10-11 RX ORDER — GABAPENTIN 300 MG/1
1 CAPSULE ORAL 3 TIMES DAILY
Refills: 0 | COMMUNITY
Start: 2019-09-27 | End: 2019-12-17

## 2019-10-11 ASSESSMENT — MIFFLIN-ST. JEOR: SCORE: 1828.59

## 2019-10-11 NOTE — PROGRESS NOTES
Subjective     Cedric Colin is a 69 year old male who presents to clinic today for the following health issues:    HPI   Chronic/Recurring Back Pain Follow Up      Where is your back pain located? (Select all that apply) low back right    How would you describe your back pain?  dull ache    Where does your back pain spread? the right foot    Since your last clinic visit for back pain, how has your pain changed? rapidly worsening - was trying to move his boat on the trailer this past Wednesday, twisted his back when trying to do this     Does your back pain interfere with your job? Not applicable    Since your last visit, have you tried any new treatment? Yes -  gabapentin, acetaminophen (Tylenol), cold, heat, muscle relaxants, NSAIDs (Ibuprofen, Naproxen), opioids, Physical Therapy, rest, steroid injection, stretching and topical pain relievers    Dog bite  On Wednesday, was doing come chores at the house when he accidentally ran over his dog. He went and picked her up and she bit him on the chin. She is a 15 yr old [place] who is not up to date on her Rabies vaccination, though has not been around any wild animals of any kind for many years. She has not had any indication of a rabies infection. They are bringing her in to the vet today to have her put down as she is not able to walk at all after the unfortunate incident. The bite areas were cleansed well with soap and water, did bleed a little. Edema at site has improved, though some mild redness remains. Tenderness only to firm palpation, otherwise no pain. Has been noting some purulent drainage this am.     Patient Active Problem List   Diagnosis     Gastroesophageal reflux disease     Hyperlipidemia     Hypertension     Paroxysmal atrial fibrillation (H)     Essential hypertension     Varicose vein     Coronary atherosclerosis of native coronary artery     Atherosclerosis of aorta (H)     Acute pericardial effusion     Past Surgical History:   Procedure  Laterality Date     COLONOSCOPY  2018    polyp - return 5 years     HEART CATH SEPTAL ABLATION       For chronic atrial fibrillation, [facility]     HERNIA REPAIR Left 2010    Left with mesh.     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       Social History     Tobacco Use     Smoking status: Former Smoker     Packs/day: 1.50     Years: 10.00     Pack years: 15.00     Types: Cigarettes     Last attempt to quit: 1980     Years since quittin.8     Smokeless tobacco: Never Used   Substance Use Topics     Alcohol use: No     Alcohol/week: 0.0 standard drinks     Comment: Alcoholic Drinks/day: rarely     Family History   Problem Relation Age of Onset     Heart Disease Mother 89        Heart Disease, possible heart disease     Other - See Comments Father 95        macular degeneration/kidney removed     No Known Problems Sister      No Known Problems Brother      Heart Disease Maternal Uncle         Heart Disease,2  heart disease     Colon Cancer No family hx of         Cancer-colon     Prostate Cancer No family hx of         Cancer-prostate         Current Outpatient Medications   Medication Sig Dispense Refill     amoxicillin-clavulanate (AUGMENTIN) 875-125 MG tablet Take 1 tablet by mouth 2 times daily for 14 days 28 tablet 0     aspirin EC 81 MG EC tablet Take 81 mg by mouth.       atorvastatin (LIPITOR) 80 MG tablet Take 1 tablet (80 mg) by mouth daily 90 tablet 4     cholecalciferol (D 2000) 2000 UNITS tablet Take 2,000 Units by mouth.       cyclobenzaprine (FLEXERIL) 5 MG tablet 1-2 tablets three times daily as needed 30 tablet 1     HYDROcodone-acetaminophen (NORCO) 5-325 MG tablet Take 1 tablet by mouth 3 times daily as needed for moderate to severe pain 21 tablet 0     lisinopril (PRINIVIL/ZESTRIL) 40 MG tablet Take 1 tablet (40 mg) by mouth daily 90 tablet 4     Multiple Vitamins-Minerals (UNICAP-M PO) Take  by mouth.       Omega-3 1000 MG CAPS Take 2,000 mg by mouth.    "    omeprazole (PRILOSEC) 20 MG DR capsule Take 1 capsule (20 mg) by mouth daily 90 capsule 4     predniSONE (DELTASONE) 20 MG tablet Take 3 tabs by mouth daily x 3 days, then 2 tabs daily x 3 days, then 1 tab daily x 3 days, then 1/2 tab daily x 3 days. 20 tablet 0     gabapentin (NEURONTIN) 300 MG capsule Take 1 capsule by mouth 3 times daily  0     Allergies   Allergen Reactions     Flecainide Other (See Comments)     States it makes him feel terrible, slow heart rate      Simvastatin Nausea     Reacts with warfarin         Reviewed and updated as needed this visit by Provider  Tobacco  Allergies  Meds  Problems  Med Hx  Surg Hx  Fam Hx  Soc Hx          Review of Systems   ROS COMP:       Objective    /76   Pulse 98   Temp 98.7  F (37.1  C) (Temporal)   Resp 16   Ht 1.88 m (6' 2\")   Wt 98.9 kg (218 lb)   SpO2 97%   BMI 27.99 kg/m    Body mass index is 27.99 kg/m .  Physical Exam   GENERAL: healthy, alert and no distress  SKIN: 1 puncture wound noted to top R side of lower mandible with 2 additional puncture wounds noted to underside of same. Upper wound with mild erythema surrounding scabbed over wound, erupted with small amount of purulent drainage during course of talking prior to physical exam, no bleeding present. No warmth noted to site. Bottom 2 wounds are scabbed over, no erythema, warmth or drainage.   Comprehensive back pain exam:  Tenderness of R SI, Pain limits the following motions: all bending, Unable to complete strength testing due to pain, Lower extremity reflexes within normal limits bilaterally, Lower extremity sensation normal and equal on both sides and straight leg raise not completed  PSYCH: mentation appears normal, affect normal/bright    Diagnostic Test Results:  Labs reviewed in Epic  none         Assessment & Plan     1. Lumbar foraminal stenosis  Increased lumbar pain after attempting to move his boat on it's trailer alone. Will start with prednisone burst to calm " inflammation, Norco given for severe pain, has had this available as needed as he works through the back pain with the pain clinic. Was started on gabapentin recently, though noted no significant improvement, has also had injections that did not benefit him at all. Will continue with plan as previously layed out.   - predniSONE (DELTASONE) 20 MG tablet; Take 3 tabs by mouth daily x 3 days, then 2 tabs daily x 3 days, then 1 tab daily x 3 days, then 1/2 tab daily x 3 days.  Dispense: 20 tablet; Refill: 0  - HYDROcodone-acetaminophen (NORCO) 5-325 MG tablet; Take 1 tablet by mouth 3 times daily as needed for moderate to severe pain  Dispense: 21 tablet; Refill: 0    2. Dog bite, initial encounter  Was bitten by his own 15 yr old dog as he picked her up after accidentally running her over at his home yesterday. 3 puncture wounds to R side of chin, 1 on top side and 2 on bottom. Top wound with erythema surrounding it and purulent drainage, warmth. Will start augmentin today. If no improvement, will return to clinic.   - amoxicillin-clavulanate (AUGMENTIN) 875-125 MG tablet; Take 1 tablet by mouth 2 times daily for 14 days  Dispense: 28 tablet; Refill: 0       Anne-Marie Macdonald NP  [facility] AND [facility]

## 2019-10-11 NOTE — NURSING NOTE
"Chief Complaint   Patient presents with     Dog Bite     on chin 10/9/19     Back Pain     Twisted back 10/9/19    Initial /76   Pulse 98   Temp 98.7  F (37.1  C) (Temporal)   Resp 16   Ht 1.88 m (6' 2\")   Wt 98.9 kg (218 lb)   SpO2 97%   BMI 27.99 kg/m   Estimated body mass index is 27.99 kg/m  as calculated from the following:    Height as of this encounter: 1.88 m (6' 2\").    Weight as of this encounter: 98.9 kg (218 lb).    Medication Reconciliation: complete      Norma J. Gosselin, LPN  "

## 2019-12-17 ENCOUNTER — OFFICE VISIT (OUTPATIENT)
Dept: INTERNAL MEDICINE | Facility: OTHER | Age: 69
End: 2019-12-17
Attending: INTERNAL MEDICINE
Payer: COMMERCIAL

## 2019-12-17 VITALS
HEIGHT: 74 IN | RESPIRATION RATE: 16 BRPM | WEIGHT: 222.8 LBS | BODY MASS INDEX: 28.59 KG/M2 | SYSTOLIC BLOOD PRESSURE: 140 MMHG | DIASTOLIC BLOOD PRESSURE: 90 MMHG | OXYGEN SATURATION: 97 % | TEMPERATURE: 97.5 F | HEART RATE: 79 BPM

## 2019-12-17 DIAGNOSIS — G54.9 NERVE ROOT COMPRESSION: ICD-10-CM

## 2019-12-17 DIAGNOSIS — M79.2 NERVE PAIN: ICD-10-CM

## 2019-12-17 DIAGNOSIS — E78.5 HYPERLIPIDEMIA, UNSPECIFIED HYPERLIPIDEMIA TYPE: ICD-10-CM

## 2019-12-17 DIAGNOSIS — I10 ESSENTIAL HYPERTENSION: ICD-10-CM

## 2019-12-17 DIAGNOSIS — Z00.00 ENCOUNTER FOR MEDICARE ANNUAL WELLNESS EXAM: Primary | ICD-10-CM

## 2019-12-17 PROCEDURE — 99213 OFFICE O/P EST LOW 20 MIN: CPT | Mod: 25 | Performed by: INTERNAL MEDICINE

## 2019-12-17 PROCEDURE — G0439 PPPS, SUBSEQ VISIT: HCPCS | Performed by: INTERNAL MEDICINE

## 2019-12-17 PROCEDURE — G0463 HOSPITAL OUTPT CLINIC VISIT: HCPCS

## 2019-12-17 RX ORDER — AMLODIPINE BESYLATE 5 MG/1
5 TABLET ORAL DAILY
COMMUNITY
End: 2020-12-03

## 2019-12-17 RX ORDER — GABAPENTIN 100 MG/1
100-300 CAPSULE ORAL 3 TIMES DAILY PRN
Qty: 90 CAPSULE | Refills: 1 | Status: SHIPPED | OUTPATIENT
Start: 2019-12-17 | End: 2020-12-03

## 2019-12-17 ASSESSMENT — PAIN SCALES - GENERAL: PAINLEVEL: MODERATE PAIN (4)

## 2019-12-17 ASSESSMENT — MIFFLIN-ST. JEOR: SCORE: 1856.85

## 2019-12-17 NOTE — PATIENT INSTRUCTIONS
Patient Education   Personalized Prevention Plan  You are due for the preventive services outlined below.  Your care team is available to assist you in scheduling these services.  If you have already completed any of these items, please share that information with your care team to update in your medical record.  Health Maintenance Due   Topic Date Due     Zoster (Shingles) Vaccine (1 of 2) 03/03/2001     Annual Wellness Visit  03/03/2016     Flu Vaccine (1) 09/01/2019

## 2019-12-17 NOTE — PROGRESS NOTES
"     Medicare Wellness Visit   Mr. Colin is a 69 year old male who presents today who presents for Preventive Visit.    Are you in the first 12 months of your Medicare coverage?  No      Health Risk Assessment     Do you feel safe in your environment? Yes    Physical Health:    In general, how would you rate your overall physical health? good    Outside of work, how many days during the week do you exercise? 6-7 days/week    Outside of work, approximately how many minutes a day do you exercise?30-45 minutes    If you drink alcohol do you typically have >3 drinks per day or >7 drinks per week? No    Do you usually eat at least 4 servings of fruit and vegetables a day, include whole grains & fiber and avoid regularly eating high fat or \"junk\" foods? Yes    Do you have any problems taking medications regularly?  No    Do you have any side effects from medications? Sleepy    Needs assistance for the following daily activities: no assistance needed    Which of the following safety concerns are present in your home?  none identified     Hearing impairment: Yes, Patient couldn't hear singing at [employer]    In the past 6 months, have you been bothered by leaking of urine? no      Screening     Patient denied hearing screening    Fall risk  Fallen 2 or more times in the past year?: Yes  Any fall with injury in the past year?: Yes    Cognitive Screening   1) Repeat 3 items (Leader, Season, Table)    2) Clock draw: NORMAL  3) 3 item recall: Recalls 2 objects   Results: NORMAL clock, 1-2 items recalled: COGNITIVE IMPAIRMENT LESS LIKELY    Mini-CogTM Copyright ROBERTO Schwartz. Licensed by the author for use in [facility]; reprinted with permission (abhishek@.[facility]). All rights reserved.      Do you have sleep apnea, excessive snoring or daytime drowsiness?: no      Medical Record Update       Reviewed and updated as needed this visit by clinical staff  Tobacco  Allergies  Meds  Med Hx  Surg Hx  Fam Hx  Soc Hx    "     Reviewed and updated as needed this visit by Provider  Tobacco  Allergies  Meds  Med Hx  Surg Hx  Fam Hx  Soc Hx          If you drink alcohol do you typically have >3 drinks per day or >7 drinks per week? No    Current providers sharing in care for this patient include:   Patient Care Team:  Kanika Gu DO as PCP - General  Kanika Gu DO as Assigned PCP     Subjective:     Patient's biggest issues over the last several months are back pain with radiation into the legs.  He has been following with orthopedics.  He has had multiple injections which he feels helped the pain in the right lower extremity at least some.  He has been taking gabapentin 1-2 times daily which seems to have helped however he is curious if there is a lower dose than the 300 mg he has been on.  He also has been on a variety of different pain meds from various providers.  He is not currently on any.  He does have a referral into neurosurgery at [facility].  They have been working to obtain his MRI imaging however this was done at an outside facility.  He is curious if anything can be done about this.  He believes he has signed the appropriate paperwork.      He has a history of hypertension.  He is on lisinopril.  He is also on amlodipine.  He denies any side effects.    He is on atorvastatin for his cholesterol.  He denies any side effects from this.  [facility]  [facility]    Review of Systems  GEN: -fevers/-chills/-night sweats/+wt change - gradual increase from last spring  NOSE: -drainage/-congestion  MOUTH/THROAT: - sore throat/-dysphagia/-sores  LUNGS: -sob/-cough  CARDIOVASCULAR: -cp/-palpitations  ABD: -pain/-change in bowels/-bloody stools  : -change in bladder  HEMATOLOGIC/LYMPHATIC: -swollen nodes  SKIN: -rashes/-lesions  MSK/RHEUM: +joint pain/-swelling  NEURO: -weakness/+parasthesias  PSYCH: -anhedonia/-depression/-anxiety     "    OBJECTIVE:   BP (!) 140/90 (BP Location: Right arm, Patient Position: Sitting, Cuff Size: Adult Regular)   Pulse 79   Temp 97.5  F (36.4  C) (Tympanic)   Resp 16   Ht 1.89 m (6' 2.41\")   Wt 101.1 kg (222 lb 12.8 oz)   SpO2 97%   BMI 28.29 kg/m      Estimated body mass index is 28.29 kg/m  as calculated from the following:    Height as of this encounter: 1.89 m (6' 2.41\").    Weight as of this encounter: 101.1 kg (222 lb 12.8 oz).     Physical Exam  GENERAL APPEARANCE: healthy, alert and no distress  EYES: Eyes grossly normal to inspection, PERRL and conjunctivae and sclerae normal  HENT: mouth without ulcers or lesions, oropharynx clear and oral mucous membranes moist  NECK: no adenopathy, no asymmetry, masses, or scars and thyroid normal to palpation  RESP: lungs clear to auscultation - no rales, rhonchi or wheezes  CV: regular rate and rhythm, normal S1 S2, no S3 or S4, no murmur, click or rub, no peripheral edema and peripheral pulses strong  ABDOMEN: nondistended  MS: no musculoskeletal defects are noted and gait is age appropriate without ataxia  SKIN: no suspicious lesions or rashes  NEURO: Normal strength and tone, sensory exam grossly normal, mentation intact and speech normal  PSYCH: mentation appears normal and affect normal/bright    Labs reviewed in EPIC    ASSESSMENT / PLAN:   1. Encounter for Medicare annual wellness exam    2. Nerve pain  -At this time he is provided a prescription with a lower dose gabapentin.  He is to take 100 to 300 mg up to 3 times a day as needed.  He is to call with any side effects.  - gabapentin (NEURONTIN) 100 MG capsule; Take 1-3 capsules (100-300 mg) by mouth 3 times daily as needed for other (pain)  Dispense: 90 capsule; Refill: 1    3. Hyperlipidemia, unspecified hyperlipidemia type  -Stable at this time.  Continue current medications.    4. Essential hypertension  - Blood pressure today of (!) 140/90   is borderline at the goal of <140/90 with minimal " exacerbation.  - Continue current regimen at this time and call if elevated at home.  Instructed to check BP at home.  - Cautioned patient to monitor with antibiotics, herbals and any OTC medications  - electrolytes and renal function done recently and ok    5. Nerve root compression  -Follow-up as referred to neurosurgery.  Will assist with transferring imaging as able.      End of Life Planning     Do you have a Health Care Directive? Yes, advance care planning is on file.    End of Life Planning:  Patient currently has an advanced directive: Yes.  Practitioner is supportive of decision.    Preventative Care Guidelines and Health Counseling     COUNSELING:  Reviewed preventive health counseling, as reflected in patient instructions  Special attention given to: pain control, vaccines, blood pressure, falls and hearing    (!) 140/90     Body mass index is 28.29 kg/m . Weight management plan: Discussed healthy diet and exercise guidelines        Appropriate preventive services were discussed with this patient, including applicable screening as appropriate for cardiovascular disease, diabetes, osteopenia/osteoporosis, and glaucoma.  As appropriate for age/gender, discussed screening for colorectal cancer, prostate cancer, breast cancer, and cervical cancer. Checklist reviewing preventive services available has been given to the patient.    Reviewed patients plan of care and provided an AVS. The Basic Care Plan (routine screening as documented in Health Maintenance) for patient meets the Care Plan requirement. This Care Plan has been established and reviewed with the Patient and any available family members.    Counseling Resources:  ATP IV Guidelines  Pooled Cohorts Equation Calculator  Breast Cancer Risk Calculator  FRAX Risk Assessment  ICSI Preventive Guidelines  Dietary Guidelines for Americans, 2010  USDA's MyPlate  ASA Prophylaxis  Lung CA Screening    Kanika Gu DO  12/17/2019  7:22 AM  [facility]  [facility]

## 2019-12-17 NOTE — NURSING NOTE
"Patient presents to the clinic today for a medicare exam.   Patient states last May he had hurt his back   Elke Kaplan LPN 12/17/2019   3:15 PM    Chief Complaint   Patient presents with     Wellness Visit       Initial BP (!) 140/90 (BP Location: Right arm, Patient Position: Sitting, Cuff Size: Adult Regular)   Pulse 79   Temp 97.5  F (36.4  C) (Tympanic)   Resp 16   Ht 1.89 m (6' 2.41\")   Wt 101.1 kg (222 lb 12.8 oz)   SpO2 97%   BMI 28.29 kg/m   Estimated body mass index is 28.29 kg/m  as calculated from the following:    Height as of this encounter: 1.89 m (6' 2.41\").    Weight as of this encounter: 101.1 kg (222 lb 12.8 oz).  Medication Reconciliation: complete  Elke Kaplan LPN    "

## 2019-12-23 ENCOUNTER — TELEPHONE (OUTPATIENT)
Dept: INTERNAL MEDICINE | Facility: OTHER | Age: 69
End: 2019-12-23

## 2019-12-23 NOTE — TELEPHONE ENCOUNTER
After speaking to [facility] they are not affiliated with [facility] so they are unable to transfer.   After proper verification patient was called and relayed that message and would proceed from here.  Jeana Barrow LPN on 12/23/2019 at 9:03 AM

## 2019-12-23 NOTE — TELEPHONE ENCOUNTER
Please call [facility] and see if they can transfer MRI lumbar spine from 6/2109 from [facility] to [facility] (patient believes they are both [facility]).  Please update patient on response.

## 2020-01-02 ENCOUNTER — OFFICE VISIT (OUTPATIENT)
Dept: INTERNAL MEDICINE | Facility: OTHER | Age: 70
End: 2020-01-02
Attending: INTERNAL MEDICINE
Payer: COMMERCIAL

## 2020-01-02 ENCOUNTER — TELEPHONE (OUTPATIENT)
Dept: INTERNAL MEDICINE | Facility: OTHER | Age: 70
End: 2020-01-02

## 2020-01-02 ENCOUNTER — MYC MEDICAL ADVICE (OUTPATIENT)
Dept: INTERNAL MEDICINE | Facility: OTHER | Age: 70
End: 2020-01-02

## 2020-01-02 VITALS
TEMPERATURE: 97.1 F | HEIGHT: 74 IN | RESPIRATION RATE: 18 BRPM | DIASTOLIC BLOOD PRESSURE: 78 MMHG | SYSTOLIC BLOOD PRESSURE: 142 MMHG | OXYGEN SATURATION: 95 % | HEART RATE: 63 BPM | WEIGHT: 226 LBS | BODY MASS INDEX: 29 KG/M2

## 2020-01-02 DIAGNOSIS — R07.9 CHEST PAIN, UNSPECIFIED TYPE: Primary | ICD-10-CM

## 2020-01-02 LAB
ALBUMIN SERPL-MCNC: 4.7 G/DL (ref 3.5–5.7)
ALP SERPL-CCNC: 79 U/L (ref 34–104)
ALT SERPL W P-5'-P-CCNC: 26 U/L (ref 7–52)
ANION GAP SERPL CALCULATED.3IONS-SCNC: 6 MMOL/L (ref 3–14)
AST SERPL W P-5'-P-CCNC: 33 U/L (ref 13–39)
BASOPHILS # BLD AUTO: 0.1 10E9/L (ref 0–0.2)
BASOPHILS NFR BLD AUTO: 1.7 %
BILIRUB SERPL-MCNC: 1.1 MG/DL (ref 0.3–1)
BUN SERPL-MCNC: 17 MG/DL (ref 7–25)
CALCIUM SERPL-MCNC: 9.6 MG/DL (ref 8.6–10.3)
CHLORIDE SERPL-SCNC: 98 MMOL/L (ref 98–107)
CO2 SERPL-SCNC: 31 MMOL/L (ref 21–31)
CREAT SERPL-MCNC: 1.06 MG/DL (ref 0.7–1.3)
DIFFERENTIAL METHOD BLD: NORMAL
EOSINOPHIL # BLD AUTO: 0.2 10E9/L (ref 0–0.7)
EOSINOPHIL NFR BLD AUTO: 4 %
ERYTHROCYTE [DISTWIDTH] IN BLOOD BY AUTOMATED COUNT: 12 % (ref 10–15)
GFR SERPL CREATININE-BSD FRML MDRD: 69 ML/MIN/{1.73_M2}
GLUCOSE SERPL-MCNC: 92 MG/DL (ref 70–105)
HCT VFR BLD AUTO: 43.3 % (ref 40–53)
HGB BLD-MCNC: 14.5 G/DL (ref 13.3–17.7)
IMM GRANULOCYTES # BLD: 0 10E9/L (ref 0–0.4)
IMM GRANULOCYTES NFR BLD: 0.2 %
LYMPHOCYTES # BLD AUTO: 1.1 10E9/L (ref 0.8–5.3)
LYMPHOCYTES NFR BLD AUTO: 26.7 %
MCH RBC QN AUTO: 29.7 PG (ref 26.5–33)
MCHC RBC AUTO-ENTMCNC: 33.5 G/DL (ref 31.5–36.5)
MCV RBC AUTO: 89 FL (ref 78–100)
MONOCYTES # BLD AUTO: 0.4 10E9/L (ref 0–1.3)
MONOCYTES NFR BLD AUTO: 9.2 %
NEUTROPHILS # BLD AUTO: 2.5 10E9/L (ref 1.6–8.3)
NEUTROPHILS NFR BLD AUTO: 58.2 %
PLATELET # BLD AUTO: 177 10E9/L (ref 150–450)
POTASSIUM SERPL-SCNC: 4.1 MMOL/L (ref 3.5–5.1)
PROT SERPL-MCNC: 7.7 G/DL (ref 6.4–8.9)
RBC # BLD AUTO: 4.89 10E12/L (ref 4.4–5.9)
SODIUM SERPL-SCNC: 135 MMOL/L (ref 134–144)
TROPONIN I SERPL-MCNC: 11 PG/ML
WBC # BLD AUTO: 4.2 10E9/L (ref 4–11)

## 2020-01-02 PROCEDURE — 80053 COMPREHEN METABOLIC PANEL: CPT | Mod: ZL | Performed by: INTERNAL MEDICINE

## 2020-01-02 PROCEDURE — 84484 ASSAY OF TROPONIN QUANT: CPT | Mod: ZL | Performed by: INTERNAL MEDICINE

## 2020-01-02 PROCEDURE — 93005 ELECTROCARDIOGRAM TRACING: CPT | Performed by: INTERNAL MEDICINE

## 2020-01-02 PROCEDURE — 85025 COMPLETE CBC W/AUTO DIFF WBC: CPT | Mod: ZL | Performed by: INTERNAL MEDICINE

## 2020-01-02 PROCEDURE — 36415 COLL VENOUS BLD VENIPUNCTURE: CPT | Mod: ZL | Performed by: INTERNAL MEDICINE

## 2020-01-02 PROCEDURE — 99214 OFFICE O/P EST MOD 30 MIN: CPT | Performed by: INTERNAL MEDICINE

## 2020-01-02 PROCEDURE — G0463 HOSPITAL OUTPT CLINIC VISIT: HCPCS

## 2020-01-02 PROCEDURE — 93010 ELECTROCARDIOGRAM REPORT: CPT | Performed by: INTERNAL MEDICINE

## 2020-01-02 RX ORDER — NITROGLYCERIN 0.4 MG/1
TABLET SUBLINGUAL
Qty: 25 TABLET | Refills: 1 | Status: SHIPPED | OUTPATIENT
Start: 2020-01-02 | End: 2021-12-13

## 2020-01-02 RX ORDER — GABAPENTIN 300 MG/1
CAPSULE ORAL
COMMUNITY
Start: 2019-12-11 | End: 2020-12-03

## 2020-01-02 ASSESSMENT — MIFFLIN-ST. JEOR: SCORE: 1864.88

## 2020-01-02 ASSESSMENT — PAIN SCALES - GENERAL: PAINLEVEL: NO PAIN (0)

## 2020-01-02 NOTE — TELEPHONE ENCOUNTER
Please call patient to schedule.  At this time I do not have any availability for at least a week or so.  I would recommend that he see Zoë Van NP tomorrow.  She will obtain labs and EKG and likely set him up for heart testing.  If he has any acute symptoms he should come in through the ER overnight.

## 2020-01-02 NOTE — TELEPHONE ENCOUNTER
Patient already had apt today, this encounter will now be closed.     Glenda Thao LPN............. January 2, 2020 11:24 AM

## 2020-01-02 NOTE — PROGRESS NOTES
"Chief Complaint   Patient presents with     Hypertension          Subjective:   Mr. Colin is a 69 year old male  seen for the acute concern today of chest pain.    First episode was 5 days ago, after shoveling a few hours prior.  It lasted 30 minutes, slightly more on right side.  He took 2 nitroglycerin and the pain went away with the second one.  He had lightheadness and presyncope after taking nitro.  No SOB, radiation, nausea, diaphoresis.  He felt he pain was achey.    He had recurrent pain Sunday night, gradually occurred.  And he took gabapentin and this helped.      He had a recurrent episode yesterday afternoon.  His felt his BP was increased, and was feeling flushed.  He took the gabapentin again and this helped.      He has been trying to exercise more and has noted more SOB with it over the last few months.    He has not been sick otherwise.  He does have a history of pericarditis and feels the pain is somewhat similar to this.  He was a smoker in the past.  He  reports that he quit smoking about 40 years ago. His smoking use included cigarettes. He has a 15.00 pack-year smoking history. He has never used smokeless tobacco.    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     History of varicose veins      Hyperlipidemia      Impingement syndrome of left shoulder 10/28/2015     Paroxysmal atrial fibrillation (H)     ongoing despite s/p multiple ablation with Dr. Ivey     Pericarditis 2011    Inflammatory, treated with anti-inflammatories, with pericardial effusion. Dr. Zheng     " "Primary osteoarthritis of shoulder 10/28/2015     Vitamin D deficiency    .      ROS:   Pertinent  ROS was performed and was negative, including for fevers, chills, chest pain, shortness of breath, increased lower extremity edema, changes in bowel or bladder, blood in the stool, difficulty swallowing, sores in the mouth. No other concerns, with exception of HPI above.      Objective:    BP (!) 142/78 (BP Location: Right arm, Patient Position: Sitting, Cuff Size: Adult Regular)   Pulse 63   Temp 97.1  F (36.2  C) (Tympanic)   Resp 18   Ht 1.88 m (6' 2\")   Wt 102.5 kg (226 lb)   SpO2 95%   BMI 29.02 kg/m    GEN: Vitals reviewed.  Patient is in no acute distress. Cooperative with exam.  HEENT: Normocephalic atraumatic.  Pupils equally round.  No scleral icterus, no conjunctival erythema. Oropharynx with no erythema or exudates. Dentition adequate.  NECK: Supple; no thyromegaly.  No neck, cervical LAD.    CV: Heart regular in rate and rhythm with no murmur.   LUNGS: Lungs clear to auscultation bilaterally.  Chest rise equal bilaterally.  No accessory muscle use.  SKIN: Warm and dry to touch.  No rash on face, arms and legs.  EXT: No clubbing or cyanosis.  No peripheral edema.    LABS: 1/2/2020 - Personally ordered/reviewed  Results for orders placed or performed in visit on 01/02/20   CBC and Differential     Status: None   Result Value Ref Range    WBC 4.2 4.0 - 11.0 10e9/L    RBC Count 4.89 4.4 - 5.9 10e12/L    Hemoglobin 14.5 13.3 - 17.7 g/dL    Hematocrit 43.3 40.0 - 53.0 %    MCV 89 78 - 100 fl    MCH 29.7 26.5 - 33.0 pg    MCHC 33.5 31.5 - 36.5 g/dL    RDW 12.0 10.0 - 15.0 %    Platelet Count 177 150 - 450 10e9/L    Diff Method Automated Method     % Neutrophils 58.2 %    % Lymphocytes 26.7 %    % Monocytes 9.2 %    % Eosinophils 4.0 %    % Basophils 1.7 %    % Immature Granulocytes 0.2 %    Absolute Neutrophil 2.5 1.6 - 8.3 10e9/L    Absolute Lymphocytes 1.1 0.8 - 5.3 10e9/L    Absolute Monocytes 0.4 0.0 - " 1.3 10e9/L    Absolute Eosinophils 0.2 0.0 - 0.7 10e9/L    Absolute Basophils 0.1 0.0 - 0.2 10e9/L    Abs Immature Granulocytes 0.0 0 - 0.4 10e9/L           Assessment/Plan:   Chest pain, unspecified type  Lab result so far negative.  EKG is essentially unchanged from prior.  Patient will be set up with a stress test.  He is provided updated sublingual nitroglycerin.  He is to come into the ER should he have any worsening symptoms.  - Comprehensive Metabolic Panel  - CBC and Differential  - Troponin GH  - EKG 12-lead, tracing only  - NM Lexiscan stress test  - nitroGLYcerin (NITROSTAT) 0.4 MG sublingual tablet  Dispense: 25 tablet; Refill: 1  - Troponin GH  - CBC and Differential  - Comprehensive Metabolic Panel      - Return/call as needed for follow-up should any new symptoms develop, for worsening of current symptoms or if symptoms do not resolve with above plan.     PERRY ENNIS DO   1/2/2020 11:32 AM    This document was prepared using voice generated softwear. While every attempt was made for accuracy, grammatical errors may exist.

## 2020-01-02 NOTE — NURSING NOTE
Patient presents to the clinic for concerns for high BP.     Medication Reconciliation: complete   Glenda Thao LPN............. January 2, 2020 11:17 AM

## 2020-01-14 ENCOUNTER — TELEPHONE (OUTPATIENT)
Dept: CARDIOLOGY | Facility: OTHER | Age: 70
End: 2020-01-14

## 2020-01-15 ENCOUNTER — HOSPITAL ENCOUNTER (OUTPATIENT)
Dept: NUCLEAR MEDICINE | Facility: OTHER | Age: 70
End: 2020-01-15
Attending: INTERNAL MEDICINE
Payer: MEDICARE

## 2020-01-15 ENCOUNTER — HOSPITAL ENCOUNTER (OUTPATIENT)
Dept: NUCLEAR MEDICINE | Facility: OTHER | Age: 70
Discharge: HOME | End: 2020-01-15
Attending: INTERNAL MEDICINE | Admitting: INTERNAL MEDICINE
Payer: MEDICARE

## 2020-01-15 ENCOUNTER — TELEPHONE (OUTPATIENT)
Dept: INTERNAL MEDICINE | Facility: OTHER | Age: 70
End: 2020-01-15

## 2020-01-15 VITALS — BODY MASS INDEX: 29 KG/M2 | HEIGHT: 74 IN | WEIGHT: 226 LBS

## 2020-01-15 DIAGNOSIS — R07.9 CHEST PAIN, UNSPECIFIED TYPE: Primary | ICD-10-CM

## 2020-01-15 DIAGNOSIS — R94.39 ABNORMAL CARDIOVASCULAR STRESS TEST: ICD-10-CM

## 2020-01-15 DIAGNOSIS — R07.9 CHEST PAIN, UNSPECIFIED TYPE: ICD-10-CM

## 2020-01-15 LAB
CV BLOOD PRESSURE: 73 %
CV STRESS MAX HR HE: 99
NUC STRESS EJECTION FRACTION: 60 %
RATE PRESSURE PRODUCT: NORMAL
STRESS ECHO BASELINE DIASTOLIC HE: 72
STRESS ECHO BASELINE HR: 61
STRESS ECHO BASELINE SYSTOLIC BP: 136
STRESS ECHO CALCULATED PERCENT HR: 65 %
STRESS ECHO LAST STRESS DIASTOLIC BP: 86
STRESS ECHO LAST STRESS SYSTOLIC BP: 140
STRESS ECHO POST ESTIMATED WORKLOAD: 1 METS
STRESS ECHO TARGET HR: 152

## 2020-01-15 PROCEDURE — 34300033 ZZH RX 343: Performed by: INTERNAL MEDICINE

## 2020-01-15 PROCEDURE — A9500 TC99M SESTAMIBI: HCPCS | Performed by: INTERNAL MEDICINE

## 2020-01-15 PROCEDURE — 78452 HT MUSCLE IMAGE SPECT MULT: CPT

## 2020-01-15 PROCEDURE — 25000128 H RX IP 250 OP 636: Performed by: NURSE PRACTITIONER

## 2020-01-15 PROCEDURE — 93016 CV STRESS TEST SUPVJ ONLY: CPT | Performed by: INTERNAL MEDICINE

## 2020-01-15 PROCEDURE — 93017 CV STRESS TEST TRACING ONLY: CPT

## 2020-01-15 PROCEDURE — 93018 CV STRESS TEST I&R ONLY: CPT | Performed by: INTERNAL MEDICINE

## 2020-01-15 RX ORDER — AMINOPHYLLINE 25 MG/ML
50 INJECTION, SOLUTION INTRAVENOUS
Status: DISCONTINUED | OUTPATIENT
Start: 2020-01-15 | End: 2020-01-16 | Stop reason: HOSPADM

## 2020-01-15 RX ORDER — REGADENOSON 0.08 MG/ML
0.4 INJECTION, SOLUTION INTRAVENOUS ONCE
Status: COMPLETED | OUTPATIENT
Start: 2020-01-15 | End: 2020-01-15

## 2020-01-15 RX ADMIN — KIT FOR THE PREPARATION OF TECHNETIUM TC99M SESTAMIBI 37 MILLICURIE: 1 INJECTION, POWDER, LYOPHILIZED, FOR SOLUTION PARENTERAL at 09:55

## 2020-01-15 RX ADMIN — KIT FOR THE PREPARATION OF TECHNETIUM TC99M SESTAMIBI 8.4 MILLICURIE: 1 INJECTION, POWDER, LYOPHILIZED, FOR SOLUTION PARENTERAL at 08:10

## 2020-01-15 RX ADMIN — REGADENOSON 0.4 MG: 0.08 INJECTION, SOLUTION INTRAVENOUS at 09:57

## 2020-01-15 ASSESSMENT — MIFFLIN-ST. JEOR: SCORE: 1864.88

## 2020-01-15 NOTE — PROGRESS NOTES
0800The patient arrived for a Lexiscan Cardiolite stress test.  The procedure, risks, and benefits were discussed with the patient and spoouse,and the consent was signed.  A saline lock was started,and the Cardiolite was injected by x-ray.  The patient was taken to the waiting area, to await resting images at 0815.  0936The patient returned from x-ray and was prepped for the stress test.   Alyssia Traore NP arrived, and the patient was administered the Lexiscan per procedure.  The patient tolerated the procedure well.  He was given a snack and taken to x-ray in stable condition for stress images.  The saline lock will be removed by x-ray for proper disposal.  The patient was instructed that the ordering MD will call with results in one to two days.  Please see the chart for the complete test results.

## 2020-01-16 ENCOUNTER — MYC MEDICAL ADVICE (OUTPATIENT)
Dept: INTERNAL MEDICINE | Facility: OTHER | Age: 70
End: 2020-01-16

## 2020-01-16 NOTE — TELEPHONE ENCOUNTER
Patient called with results from his stress test.  It was abnormal.  Recommend referral to cardiology.  He is on a statin, aspirin and has nitroglycerin available.  Encouraged to come into the ER should he have any recurrent symptoms.  Order placed for cardiology at [facility] as he would like to see Dr. Zheng.

## 2020-01-21 ENCOUNTER — TRANSFERRED RECORDS (OUTPATIENT)
Dept: HEALTH INFORMATION MANAGEMENT | Facility: OTHER | Age: 70
End: 2020-01-21

## 2020-01-27 ENCOUNTER — TRANSFERRED RECORDS (OUTPATIENT)
Dept: HEALTH INFORMATION MANAGEMENT | Facility: OTHER | Age: 70
End: 2020-01-27

## 2020-01-27 LAB
CHOLEST SERPL-MCNC: 128 MG/DL (ref 114–200)
CREAT SERPL-MCNC: 1.02 MG/DL (ref 0.7–1.2)
GFR SERPL CREATININE-BSD FRML MDRD: >60 ML/MIN/1.73M*2
GLUCOSE SERPL-MCNC: 103 MG/DL (ref 70–99)
HDLC SERPL-MCNC: 44 MG/DL (ref 40–60)
LDLC SERPL CALC-MCNC: 65 MG/DL
POTASSIUM SERPL-SCNC: 4.2 MEQ/L (ref 3.4–5.1)
TRIGL SERPL-MCNC: 95 MG/DL (ref 10–200)
TSH SERPL-ACNC: 2.98 ULU/ML (ref 0.4–3.99)

## 2020-01-29 DIAGNOSIS — R00.1 BRADYCARDIA: Primary | ICD-10-CM

## 2020-01-30 DIAGNOSIS — M51.26 LUMBAR DISC HERNIATION: Primary | ICD-10-CM

## 2020-02-24 ENCOUNTER — TRANSFERRED RECORDS (OUTPATIENT)
Dept: HEALTH INFORMATION MANAGEMENT | Facility: OTHER | Age: 70
End: 2020-02-24

## 2020-03-09 ENCOUNTER — HOSPITAL ENCOUNTER (OUTPATIENT)
Dept: PHYSICAL THERAPY | Facility: OTHER | Age: 70
Setting detail: THERAPIES SERIES
End: 2020-03-09
Attending: NEUROLOGICAL SURGERY
Payer: COMMERCIAL

## 2020-03-09 DIAGNOSIS — M51.26 LUMBAR DISC HERNIATION: ICD-10-CM

## 2020-03-09 PROCEDURE — 97110 THERAPEUTIC EXERCISES: CPT | Mod: GP

## 2020-03-09 PROCEDURE — 97161 PT EVAL LOW COMPLEX 20 MIN: CPT | Mod: GP

## 2020-03-09 NOTE — PROGRESS NOTES
03/09/20 1400   General Information   Type of Visit Initial OP Ortho PT Evaluation   Start of Care Date 03/09/20   Referring Physician Dr. Diaz   Patient/Family Goals Statement Return near prior level of function, including walking long distance and getting back to exercise program at gym   Orders Evaluate and Treat   Date of Order 01/30/20   Certification Required? No   Medical Diagnosis Lumbar disc herniation M51.26    Surgical/Medical history reviewed Yes   Precautions/Limitations spinal precautions   Special Instructions Per [facility] laminectomy/discectomy protocol   General Information Comments Direct supervision provided;Therapy services provided with the co-signing licensed therapist guiding and directing the services, and providing the skilled judgement and assessment throughout the session   Body Part(s)   Body Part(s) Lumbar Spine/SI   Presentation and Etiology   Pertinent history of current problem (include personal factors and/or comorbidities that impact the POC) Injured back in 05/2019 while lifting logs, reports it took providers some time to realize it was his back that was causing him trouble, experienced radicular pain, as well as localized pain which was chronic before injury, would like to get ack to usual exercise program and walking, underwent laminectomy to improve condition   Functional Limitations perform activities of daily living;perform desired leisure / sports activities   Symptom Location Low back, into RLE   How/Where did it occur While lifting   Onset date of current episode/exacerbation 02/24/20   Chronicity Recurrent   Pain rating (0-10 point scale) Best (/10);Worst (/10)   Best (/10) 2   Worst (/10) 8   Pain quality E. Shooting;H. Other  (tingling)   Frequency of pain/symptoms B. Intermittent   Pain/symptoms exacerbated by G. Certain positions   Pain/symptoms eased by D. Nothing;F. Certain positions   Prior Level of Function   Prior Level of Function-Mobility Indpependent in  all   Prior Level of Function-ADLs Independent in all   Current Level of Function   Current Community Support Family/friend caregiver   Patient role/employment history A. Employed   [facility]   Current equipment-Gait/Locomotion Standard cane   Fall Risk Screen   Fall screen completed by PT   Have you fallen 2 or more times in the past year? Yes   Is patient a fall risk? Department fall risk interventions implemented   Fall screen comments Falls occurred after taking gabapentin   Lumbar Spine/SI Objective Findings   Observation Pt presents with single end cane and juana  slightly guarded position   Integumentary Staples still present, incision shows some normal redness along margins   Posture Somewhat guaded   Flexion ROM N/T through full range, s/p surgery   Extension ROM N/T through full range, s/p surgery   Right Side Bending ROM N/T through full range, s/p surgery   Left Side Bending ROM N/T through full range, s/p surgery   Hamstring Flexibility Excellent in supine   SLR Good flexibility B   Lumbar/SI Special Tests Comments N/T due to s/p laminectomy   Planned Therapy Interventions   Planned Therapy Interventions neuromuscular re-education;ROM;strengthening;stretching   Clinical Impression   Criteria for Skilled Therapeutic Interventions Met yes, treatment indicated   PT Diagnosis Lumbar disc herniation M51.26    Influenced by the following impairments Pain, ROM restrictions   Functional limitations due to impairments Tolerance to increased level of activity   Clinical Presentation Stable/Uncomplicated   Clinical Decision Making (Complexity) Low complexity   Predicted Duration of Therapy Intervention (days/wks) 15 visits over 8 weeks   Risk & Benefits of therapy have been explained Yes   Patient, Family & other staff in agreement with plan of care Yes   ORTHO GOALS   PT Ortho Eval Goals 1;2;3   Ortho Goal 1   Goal Identifier Radicular pain/numbness   Goal Description Pt will report 90%  resolution of radicular pain and numbness   Target Date 05/04/20   Ortho Goal 2   Goal Identifier Exercise program   Goal Description Pt will be able to return to majority of his own exercise program in order to improve long term health and outcomes   Target Date 05/04/20   Ortho Goal 3   Goal Identifier HEP   Goal Description Pt will demonstrate and report compliance with HEP in order to increase independence and improve outcomes   Target Date 05/04/20   Total Evaluation Time   PT Eval, Low Complexity Minutes (65330) 20

## 2020-03-12 ENCOUNTER — HOSPITAL ENCOUNTER (OUTPATIENT)
Dept: PHYSICAL THERAPY | Facility: OTHER | Age: 70
Setting detail: THERAPIES SERIES
End: 2020-03-12
Attending: NEUROLOGICAL SURGERY
Payer: COMMERCIAL

## 2020-03-12 PROCEDURE — 97110 THERAPEUTIC EXERCISES: CPT | Mod: GP

## 2020-03-16 ENCOUNTER — HOSPITAL ENCOUNTER (OUTPATIENT)
Dept: PHYSICAL THERAPY | Facility: OTHER | Age: 70
Setting detail: THERAPIES SERIES
End: 2020-03-16
Attending: NEUROLOGICAL SURGERY
Payer: COMMERCIAL

## 2020-03-16 PROCEDURE — 97110 THERAPEUTIC EXERCISES: CPT | Mod: GP

## 2020-05-27 NOTE — PROGRESS NOTES
Outpatient Physical Therapy Discharge Note     Patient: Cedric HUA Past  : 1950    Beginning/End Dates of Reporting Period:  3/9/2020 to 3/16/2020    Referring Provider: Dr. Diaz    Therapy Diagnosis: Lumbar disc herniation M51.26     Patient called and cancelled remaining PT visits. He reported he was doing well and was able to progress independently with the HEP.     Objective Measurements: Not completed due to unplanned discharge     Outcome Measures (most recent score): Not completed due to unplanned discharge     Goals: Not completed due to unplanned discharge     Plan:  Discharge from therapy.    Discharge:    Reason for Discharge: Patient chooses to discontinue therapy.    Discharge Plan: Patient to continue home program.

## 2020-06-01 ENCOUNTER — MYC MEDICAL ADVICE (OUTPATIENT)
Dept: INTERNAL MEDICINE | Facility: OTHER | Age: 70
End: 2020-06-01

## 2020-06-01 NOTE — TELEPHONE ENCOUNTER
Please call to schedule as we will likely need xrays - patient can schedule with me on Thursday or Friday or can see my partner SR[facility], as soon as tomorrow

## 2020-06-04 ENCOUNTER — HOSPITAL ENCOUNTER (OUTPATIENT)
Dept: GENERAL RADIOLOGY | Facility: OTHER | Age: 70
End: 2020-06-04
Attending: INTERNAL MEDICINE
Payer: MEDICARE

## 2020-06-04 ENCOUNTER — OFFICE VISIT (OUTPATIENT)
Dept: INTERNAL MEDICINE | Facility: OTHER | Age: 70
End: 2020-06-04
Attending: INTERNAL MEDICINE
Payer: MEDICARE

## 2020-06-04 VITALS
DIASTOLIC BLOOD PRESSURE: 82 MMHG | BODY MASS INDEX: 28.79 KG/M2 | RESPIRATION RATE: 16 BRPM | HEART RATE: 63 BPM | TEMPERATURE: 98.5 F | SYSTOLIC BLOOD PRESSURE: 148 MMHG | OXYGEN SATURATION: 98 % | WEIGHT: 224.2 LBS

## 2020-06-04 DIAGNOSIS — M54.50 ACUTE BILATERAL LOW BACK PAIN WITHOUT SCIATICA: ICD-10-CM

## 2020-06-04 DIAGNOSIS — M54.50 ACUTE BILATERAL LOW BACK PAIN WITHOUT SCIATICA: Primary | ICD-10-CM

## 2020-06-04 DIAGNOSIS — M62.838 MUSCLE SPASM: ICD-10-CM

## 2020-06-04 PROCEDURE — G0463 HOSPITAL OUTPT CLINIC VISIT: HCPCS | Mod: 25

## 2020-06-04 PROCEDURE — G0463 HOSPITAL OUTPT CLINIC VISIT: HCPCS

## 2020-06-04 PROCEDURE — 72220 X-RAY EXAM SACRUM TAILBONE: CPT

## 2020-06-04 PROCEDURE — 99213 OFFICE O/P EST LOW 20 MIN: CPT | Performed by: INTERNAL MEDICINE

## 2020-06-04 PROCEDURE — 72100 X-RAY EXAM L-S SPINE 2/3 VWS: CPT

## 2020-06-04 RX ORDER — METHOCARBAMOL 500 MG/1
500 TABLET, FILM COATED ORAL 4 TIMES DAILY PRN
Qty: 40 TABLET | Refills: 1 | Status: SHIPPED | OUTPATIENT
Start: 2020-06-04 | End: 2020-12-03

## 2020-06-04 ASSESSMENT — PAIN SCALES - GENERAL: PAINLEVEL: EXTREME PAIN (8)

## 2020-06-04 NOTE — NURSING NOTE
Patient presents to clinic today for back pain since last week. It's been getting worse since the weekend. He has a history of recent back surgery.  Medication reconciliation completed.  Siobhan Gramajo CMA([facility])..................6/4/2020   8:10 AM

## 2020-06-04 NOTE — PATIENT INSTRUCTIONS
Ice every 3-4 hours, gentle exercise/rest as much as possible.    Recommend Ibuprofen 200 mg tablet (3 tablets) 3-4 times daily for 5-7 days and then as needed.  Be sure to take with food.  Maximum 16 per day.    Or     Naproxen 220 mg tablet (1-2 tablets) 1-2 times daily for 5-7 days and then as needed.  Be sure totake with food.  Max of 4 per day.    Caution with NSAIDS (ibuprofen, aspirin, naproxen, aleve, advil) due to risk for increased blood pressure,stomach pain/nausea/ulcers and kidney damage; use minimal amount necessary    -- in addition to --     Tylenol 1000mg (2- extra strength 500mg tablets or 3 regular strength 325mg tablets) three times a day; Maximum of 4000mg daily    - Return/call as needed for follow-up should any new symptoms develop, for worsening of current symptoms or if symptoms do notresolve with above plan.

## 2020-06-04 NOTE — PROGRESS NOTES
"Chief Complaint   Patient presents with     Back Pain     low back          Subjective:   Mr. Colin is a 70 year old male  seen for the acute concern today of ongoing back pain.    He recently had back surgery approximately 3 months ago.  He has had persistent numbness in lower right leg. Dr. Goss said that may take up to a year to get better.     However approximately 6 days ago started having back pain again.  This is in the bilateral low back.    No obvious injury.  He is having trouble straightening up--standing upright. I want to lean forward because of stiffness and pain.      Different pain from prior to surgery in that it is not down the leg and more localized to sacroiliac/low back.      He has to sleep on recliner/couch and not in bed due to the pain.    He had tapered off of gabapentin but now restarted it 300mg nightly which helps him sleep.  He does request a refill of his narcotics that he had prior to his surgery.    He  reports that he quit smoking about 40 years ago. His smoking use included cigarettes. He has a 15.00 pack-year smoking history. He has never used smokeless tobacco.    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     History of varicose veins      Hyperlipidemia      Impingement syndrome of left shoulder 10/28/2015     Paroxysmal atrial fibrillation (H)     ongoing despite s/p multiple ablation with Dr. Ivey     Pericarditis 2011    Inflammatory, treated with anti-inflammatories, with pericardial effusion. Dr. Zheng     Primary osteoarthritis of shoulder " 10/28/2015     Vitamin D deficiency    .      ROS:   Pertinent  ROS was performed and was negative, including for fevers, chills, changes in bowel or bladder. No other concerns, with exception of HPI above.      Objective:    BP (!) 148/82 (BP Location: Right arm, Patient Position: Sitting, Cuff Size: Adult Regular)   Pulse 63   Temp 98.5  F (36.9  C) (Tympanic)   Resp 16   Wt 101.7 kg (224 lb 3.2 oz)   SpO2 98%   BMI 28.79 kg/m    GEN: Vitals reviewed.  Patient is in no acute distress. Cooperative with exam.  HEENT: Normocephalic atraumatic.  Pupils equally round.  No scleral icterus, no conjunctival erythema.   SKIN: Warm and dry to touch.  No rash on visible skin.  EXT: No clubbing or cyanosis.  No peripheral edema.  MSK:  Gait is stiff and antalgic. ROM of lumbar flexors and extensors is limited.  ROM with rotation and side bending is limited secondary to pain.  Pain to palpation ofparaspinal muscles on bilateral lumbar and sacral region.  No pain to palpation of spine directly.  BUE and BLE sensation is intact.  Minimal muscle spasm and hypertrophy in the lumbar and sacral paraspinal muscles.  BUE and BLE muscle strength intact.  Pain to palpation of the bilateral SI joints is noted.       Assessment/Plan:   Acute bilateral low back pain without sciatica  - Ice, elevation, gentle movement/rest as tolerated  - Ibuprofen, Naproxen or Tylenol as needed; prescription given for a muscle relaxer, discussed reason for no narcotics   - offered PT, patient declined at this time  - patient is to call if he has additional problems with this or if new symptoms develop  -X-rays obtained and diffuse degenerative disease is noted.  May benefit from SI joint injections.  -If he has worsening issues may need to refer back to neurosurgery.  - XR Lumbar Spine 2/3 Views  - XR Sacrum and Coccyx 2 Views  - methocarbamol (ROBAXIN) 500 MG tablet  Dispense: 40 tablet; Refill: 1    Muscle spasm  -We will trial muscle relaxer in  addition to anti-inflammatories for short time  - methocarbamol (ROBAXIN) 500 MG tablet  Dispense: 40 tablet; Refill: 1      - Return/call as needed for follow-up should any new symptoms develop, for worsening of current symptoms or if symptoms do not resolve with above plan.    Return in about 1 week (around 6/11/2020) for Recheck.     PERRY ENNIS, DO   6/4/2020 8:21 AM    This document was prepared using voice generated softwear. While every attempt was made for accuracy, grammatical errors may exist.

## 2020-06-15 ENCOUNTER — MYC MEDICAL ADVICE (OUTPATIENT)
Dept: INTERNAL MEDICINE | Facility: OTHER | Age: 70
End: 2020-06-15

## 2020-06-15 NOTE — TELEPHONE ENCOUNTER
Spoke with [facility] staff about pt's requests. She staff stated she talked with pt around 10:50am and sent him a form to sign the [facility] to send him the surgery summery from [facility] he was looking for.   Gavin De Paz LPN on 6/15/2020 at 2:26 PM

## 2020-12-03 ENCOUNTER — OFFICE VISIT (OUTPATIENT)
Dept: INTERNAL MEDICINE | Facility: OTHER | Age: 70
End: 2020-12-03
Attending: INTERNAL MEDICINE
Payer: COMMERCIAL

## 2020-12-03 VITALS
BODY MASS INDEX: 29.9 KG/M2 | DIASTOLIC BLOOD PRESSURE: 86 MMHG | HEIGHT: 74 IN | OXYGEN SATURATION: 99 % | RESPIRATION RATE: 16 BRPM | SYSTOLIC BLOOD PRESSURE: 132 MMHG | TEMPERATURE: 96.3 F | WEIGHT: 233 LBS | HEART RATE: 60 BPM

## 2020-12-03 DIAGNOSIS — Z00.00 ENCOUNTER FOR MEDICARE ANNUAL WELLNESS EXAM: Primary | ICD-10-CM

## 2020-12-03 DIAGNOSIS — E78.5 HYPERLIPIDEMIA, UNSPECIFIED HYPERLIPIDEMIA TYPE: ICD-10-CM

## 2020-12-03 DIAGNOSIS — Z23 NEED FOR INFLUENZA VACCINATION: ICD-10-CM

## 2020-12-03 DIAGNOSIS — K21.9 GASTROESOPHAGEAL REFLUX DISEASE, UNSPECIFIED WHETHER ESOPHAGITIS PRESENT: ICD-10-CM

## 2020-12-03 DIAGNOSIS — M79.2 NERVE PAIN: ICD-10-CM

## 2020-12-03 DIAGNOSIS — I10 ESSENTIAL HYPERTENSION: ICD-10-CM

## 2020-12-03 DIAGNOSIS — I70.0 ATHEROSCLEROSIS OF AORTA (H): ICD-10-CM

## 2020-12-03 DIAGNOSIS — I48.0 PAROXYSMAL ATRIAL FIBRILLATION (H): ICD-10-CM

## 2020-12-03 LAB
ALBUMIN SERPL-MCNC: 4.4 G/DL (ref 3.5–5.7)
ALP SERPL-CCNC: 80 U/L (ref 34–104)
ALT SERPL W P-5'-P-CCNC: 22 U/L (ref 7–52)
ANION GAP SERPL CALCULATED.3IONS-SCNC: 8 MMOL/L (ref 3–14)
AST SERPL W P-5'-P-CCNC: 31 U/L (ref 13–39)
BILIRUB SERPL-MCNC: 1.1 MG/DL (ref 0.3–1)
BUN SERPL-MCNC: 14 MG/DL (ref 7–25)
CALCIUM SERPL-MCNC: 9.7 MG/DL (ref 8.6–10.3)
CHLORIDE SERPL-SCNC: 99 MMOL/L (ref 98–107)
CHOLEST SERPL-MCNC: 141 MG/DL
CO2 SERPL-SCNC: 27 MMOL/L (ref 21–31)
CREAT SERPL-MCNC: 1.06 MG/DL (ref 0.7–1.3)
ERYTHROCYTE [DISTWIDTH] IN BLOOD BY AUTOMATED COUNT: 12.4 % (ref 10–15)
GFR SERPL CREATININE-BSD FRML MDRD: 69 ML/MIN/{1.73_M2}
GLUCOSE SERPL-MCNC: 97 MG/DL (ref 70–105)
HCT VFR BLD AUTO: 42.6 % (ref 40–53)
HDLC SERPL-MCNC: 40 MG/DL (ref 23–92)
HGB BLD-MCNC: 14.5 G/DL (ref 13.3–17.7)
LDLC SERPL CALC-MCNC: 78 MG/DL
MCH RBC QN AUTO: 29.5 PG (ref 26.5–33)
MCHC RBC AUTO-ENTMCNC: 34 G/DL (ref 31.5–36.5)
MCV RBC AUTO: 87 FL (ref 78–100)
NONHDLC SERPL-MCNC: 101 MG/DL
PLATELET # BLD AUTO: 177 10E9/L (ref 150–450)
POTASSIUM SERPL-SCNC: 3.9 MMOL/L (ref 3.5–5.1)
PROT SERPL-MCNC: 7.7 G/DL (ref 6.4–8.9)
RBC # BLD AUTO: 4.91 10E12/L (ref 4.4–5.9)
SODIUM SERPL-SCNC: 134 MMOL/L (ref 134–144)
TRIGL SERPL-MCNC: 113 MG/DL
WBC # BLD AUTO: 5.7 10E9/L (ref 4–11)

## 2020-12-03 PROCEDURE — 99214 OFFICE O/P EST MOD 30 MIN: CPT | Mod: 25 | Performed by: INTERNAL MEDICINE

## 2020-12-03 PROCEDURE — G0463 HOSPITAL OUTPT CLINIC VISIT: HCPCS | Mod: 25

## 2020-12-03 PROCEDURE — 85027 COMPLETE CBC AUTOMATED: CPT | Mod: ZL | Performed by: INTERNAL MEDICINE

## 2020-12-03 PROCEDURE — G0008 ADMIN INFLUENZA VIRUS VAC: HCPCS

## 2020-12-03 PROCEDURE — 80061 LIPID PANEL: CPT | Mod: ZL | Performed by: INTERNAL MEDICINE

## 2020-12-03 PROCEDURE — 36415 COLL VENOUS BLD VENIPUNCTURE: CPT | Mod: ZL | Performed by: INTERNAL MEDICINE

## 2020-12-03 PROCEDURE — G0439 PPPS, SUBSEQ VISIT: HCPCS | Performed by: INTERNAL MEDICINE

## 2020-12-03 PROCEDURE — 80053 COMPREHEN METABOLIC PANEL: CPT | Mod: ZL | Performed by: INTERNAL MEDICINE

## 2020-12-03 RX ORDER — ATORVASTATIN CALCIUM 80 MG/1
80 TABLET, FILM COATED ORAL DAILY
Qty: 90 TABLET | Refills: 4 | Status: SHIPPED | OUTPATIENT
Start: 2020-12-03 | End: 2021-12-13

## 2020-12-03 RX ORDER — GABAPENTIN 100 MG/1
100 CAPSULE ORAL 2 TIMES DAILY PRN
Qty: 90 CAPSULE | Refills: 1 | Status: SHIPPED | OUTPATIENT
Start: 2020-12-03 | End: 2021-12-13

## 2020-12-03 RX ORDER — GABAPENTIN 300 MG/1
300 CAPSULE ORAL
Qty: 90 CAPSULE | Refills: 3 | Status: SHIPPED | OUTPATIENT
Start: 2020-12-03 | End: 2021-12-13

## 2020-12-03 RX ORDER — LISINOPRIL 40 MG/1
40 TABLET ORAL DAILY
Qty: 90 TABLET | Refills: 4 | Status: SHIPPED | OUTPATIENT
Start: 2020-12-03 | End: 2021-12-13

## 2020-12-03 RX ORDER — AMLODIPINE BESYLATE 5 MG/1
5 TABLET ORAL DAILY
Qty: 90 TABLET | Refills: 3 | Status: SHIPPED | OUTPATIENT
Start: 2020-12-03 | End: 2021-12-13

## 2020-12-03 ASSESSMENT — MIFFLIN-ST. JEOR: SCORE: 1883.69

## 2020-12-03 ASSESSMENT — PAIN SCALES - GENERAL: PAINLEVEL: MILD PAIN (2)

## 2020-12-03 NOTE — PATIENT INSTRUCTIONS
Patient Education   Personalized Prevention Plan  You are due for the preventive services outlined below.  Your care team is available to assist you in scheduling these services.  If you have already completed any of these items, please share that information with your care team to update in your medical record.  Health Maintenance Due   Topic Date Due     Flu Vaccine (1) 09/01/2020     Zoster (Shingles) Vaccine (2 of 2) 01/30/2020     FALL RISK ASSESSMENT  01/02/2021

## 2020-12-03 NOTE — NURSING NOTE
Patient presents to clinic today for annual Medicare wellness visit.  Medication reconciliation completed.  Siobhan Gramajo CMA([facility])..................12/3/2020   2:11 PM      Immunization Documentation    Prior to Immunization administration, verified patients identity using patient's name and date of birth. Please see IMMUNIZATIONS  and order for additional information.  Patient / Parent instructed to remain in clinic for 15 minutes and report any adverse reaction to staff immediately.    Was entire vial of medication used? Yes  Vial/Syringe: Syringe    Siobhan Gramajo CMA([facility]).............12/3/2020  3:17 PM

## 2020-12-03 NOTE — PROGRESS NOTES
"Medicare Wellness Visit   Mr. Colin is a 70 year old male who presents today who presents for Preventive Visit.      Are you in the first 12 months of your Medicare coverage?  No    Health Risk Assessment     Do you feel safe in your environment? Yes    Physical Health:    In general, how would you rate your overall physical health? good    Outside of work, how many days during the week do you exercise? 6-7 days/week    Outside of work, approximately how many minutes a day do you exercise?greater than 60 minutes    If you drink alcohol do you typically have >3 drinks per day or >7 drinks per week? No    Do you usually eat at least 4 servings of fruit and vegetables a day, include whole grains & fiber and avoid regularly eating high fat or \"junk\" foods? Yes    Do you have any problems taking medications regularly?  No    Do you have any side effects from medications? none    Needs assistance for the following daily activities: no assistance needed    Which of the following safety concerns are present in your home?  none identified     Hearing impairment: Yes, Need to ask people to speak up or repeat themselves.    Difficulty understanding soft or whispered speech.    In the past 6 months, have you been bothered by leaking of urine? no      Screening     Fall risk  Fallen 2 or more times in the past year?: No  Any fall with injury in the past year?: No    Cognitive Screening   1) Repeat 3 items (Leader, Season, Table)    2) Clock draw: NORMAL  3) 3 item recall: Recalls 3 objects  Results: 3 items recalled: COGNITIVE IMPAIRMENT LESS LIKELY    Mini-CogTM Copyright ROBERTO Schwartz. Licensed by the author for use in [facility]; reprinted with permission (abhishek@.[email or web address]). All rights reserved.      Do you have sleep apnea, excessive snoring or daytime drowsiness?: no    Breast cancer screening: n/a    Regular dental visits: annually, last seen in January    Eye exam with in 2 years: annually, went in June      End of " Life Planning     Have you ever done Advance Care Planning? (For example, a Health Directive, POLST, or a discussion with a medical provider or your loved ones about your wishes): No, advance care planning information given to patient to review.  Advanced care planning was discussed at today's visit.      End of Life Planning:  Patient currently has an advanced directive: No.  I have verified the patient's ablity to prepare an advanced directive/make health care decisions.        Medical Record Update     Reviewed and updated as needed this visit by clinical staff  Tobacco  Allergies  Meds  Med Hx  Surg Hx  Fam Hx  Soc Hx      Reviewed and updated as needed this visit by Provider  Tobacco  Allergies  Meds  Problems  Med Hx  Surg Hx  Fam Hx            Current providers sharing in care for this patient include:   Patient Care Team:  Kanika Gu DO as PCP - General  Kanika Gu DO as Assigned PCP  Zoë Van NP as Nurse Practitioner (Internal Medicine)     Subjective:   Patient overall has been doing okay.  He continues on chronic medications.  He would like his flu shot today.    He has a history of paroxysmal atrial fibrillation with hypertension, hyperlipidemia and aortic atherosclerosis.  He continues on amlodipine, lisinopril and atorvastatin.   He has not required rate control.  He has declined anticoagulation in the past and continues on baby aspirin.    He has a history of acid reflux.  He continues on omeprazole for this.  He denies any side effects.    He has had some nerve pain off and on.  He is on gabapentin.  He found that 300 mg multiple times a day was too high so he has been taking 100 mg twice daily earlier in the day and then 300 mg at bedtime.  He did have some difficulty at the pharmacy getting these prescriptions filled.  He has not noted any side effects that he is aware of.      Review of Systems     GEN: -fevers/-chills/-night sweats/+wt change - 9 lb  "increase  NEURO: -headaches/-vision changes  EARS: -hearing changes/+chronic tinnitus  NOSE: -drainage/-congestion  MOUTH/THROAT: - sore throat/-dysphagia/-sores  LUNGS: +slightly sob sometimes/-cough  CARDIOVASCULAR: -cp/-palpitations  ABD: -pain/-change in bowels/-bloody stools  : -change in bladder/-sores  HEMATOLOGIC/LYMPHATIC: -swollen nodes  SKIN: -rashes/-lesions  MSK/RHEUM: +joint pain/-joint swelling  NEURO: -weakness/+parasthesias - in right leg  PSYCH: -depression/-anxiety        OBJECTIVE:     Vitals:    12/03/20 1415   BP: 132/86   BP Location: Right arm   Patient Position: Sitting   Cuff Size: Adult Large   Pulse: 60   Resp: 16   Temp: 96.3  F (35.7  C)   TempSrc: Tympanic   SpO2: 99%   Weight: 105.7 kg (233 lb)   Height: 1.867 m (6' 1.5\")        Estimated body mass index is 30.32 kg/m  as calculated from the following:    Height as of this encounter: 1.867 m (6' 1.5\").    Weight as of this encounter: 105.7 kg (233 lb).     Physical Exam  GEN: Vitals reviewed. Healthy appearing. Patient is in no acute distress. Cooperative with exam.  HEENT: Normocephalic atraumatic.  Eyes grossly normal to inspection.  No discharge or erythema, or obvious scleral/conjunctival abnormalities.   NECK: Supple; slight thyromegaly or masses noted.  No cervical or supraclavicular lymphadenopathy.  CV: Heart regular in rate and rhythm with no murmur.    LUNGS: No audible wheeze, cough, or visible cyanosis.  No visible retractions or increased work of breathing.  Lungs clear to auscultation bilaterally.    ABD:  Obese, nondistended  SKIN: Warm and dry to touch.  Visible skin clear. No significant rash, abnormal pigmentation or lesions.  EXT: No clubbing or cyanosis.  No peripheral edema.  NEURO: Alert and oriented to person, place, and time.  Cranial nerves II-XII grossly intact with no focal or lateralizing deficits.  Muscle tone normal.  Gait normal. No tremor.   MSK: ROM of upper and lower ext symmetric and full.  PSYCH: " Mood is good.  Mentation appears normal, affect normal/bright, judgement and insight intact, normal speech and appearance well-groomed.      Labs reviewed in EPIC    ASSESSMENT / PLAN:     Encounter for Medicare annual wellness exam    Need for influenza vaccination  - [facility] IMM-  FLU VACCINE, INCREASED ANTIGEN, PRESV FREE    Atherosclerosis of aorta (H)  - on asa, statin, ACEI/ARB, SLNTG  - no symptoms at this time; continue meds as prescribed    Paroxysmal atrial fibrillation (H)  -Continue to have further discussions regarding anticoagulation in the future.  Goal 32 or changing symptoms.  - CBC with platelets    Hyperlipidemia, unspecified hyperlipidemia type  -Overall stable, continue atorvastatin  - atorvastatin (LIPITOR) 80 MG tablet  Dispense: 90 tablet; Refill: 4  - Lipid Profile    Essential hypertension  - Blood pressure today of 132/86   is at the goal of <140/90 with no exacerbation.  - Continue current regimen.  Instructed to check BP at home.  - Cautioned patient to monitor with antibiotics, herbals and any OTC medications  - electrolytes and renal function done  - lisinopril (ZESTRIL) 40 MG tablet  Dispense: 90 tablet; Refill: 4  - amLODIPine (NORVASC) 5 MG tablet  Dispense: 90 tablet; Refill: 3  - Comprehensive metabolic panel    Nerve pain  -At this time he will continue with 100 mg tablets earlier in the day.  He will continue with 300 mg at bedtime.  He is to call with any side effects or ongoing issues filling these.  - gabapentin (NEURONTIN) 100 MG capsule  Dispense: 90 capsule; Refill: 1  - gabapentin (NEURONTIN) 300 MG capsule  Dispense: 90 capsule; Refill: 3    Gastroesophageal reflux disease, unspecified whether esophagitis present  -Stable, continue current medication  - omeprazole (PRILOSEC) 20 MG DR capsule  Dispense: 90 capsule; Refill: 4        Preventative Care Guidelines and Health Counseling     COUNSELING:  Reviewed preventive health counseling  Special attention given to:    Preventative screening  Regular exercise  Healthy diet/nutrition  Immunizations   Reviewed preventive health counseling, as reflected in patient instructions    132/86       Body mass index is 30.32 kg/m . Weight management plan: Discussed healthy diet and exercise guidelines        Appropriate preventive services were discussed with this patient, including applicable screening as appropriate for cardiovascular disease, diabetes, osteopenia/osteoporosis, and glaucoma.  As appropriate for age/gender, discussed screening for colorectal cancer, prostate cancer, breast cancer, and cervical cancer. Checklist reviewing preventive services available has been given to the patient.    Reviewed patients plan of care and provided an AVS. The Basic Care Plan (routine screening as documented in Health Maintenance) for patient meets the Care Plan requirement. This Care Plan has been established and reviewed with the Patient and any available family members.    Counseling Resources:  ATP IV Guidelines  Pooled Cohorts Equation Calculator  Breast Cancer Risk Calculator  FRAX Risk Assessment  ICSI Preventive Guidelines  Dietary Guidelines for Americans, 2010  USDA's MyPlate  ASA Prophylaxis  Lung CA Screening    Kanika Gu,   12/3/2020  2:19 PM  [facility]

## 2021-02-27 ENCOUNTER — MYC MEDICAL ADVICE (OUTPATIENT)
Dept: INTERNAL MEDICINE | Facility: OTHER | Age: 71
End: 2021-02-27

## 2021-02-27 DIAGNOSIS — Z12.5 SCREENING FOR PROSTATE CANCER: Primary | ICD-10-CM

## 2021-04-03 ENCOUNTER — MYC MEDICAL ADVICE (OUTPATIENT)
Dept: INTERNAL MEDICINE | Facility: OTHER | Age: 71
End: 2021-04-03

## 2021-10-13 ENCOUNTER — IMMUNIZATION (OUTPATIENT)
Dept: FAMILY MEDICINE | Facility: OTHER | Age: 71
End: 2021-10-13
Attending: FAMILY MEDICINE
Payer: MEDICARE

## 2021-10-13 PROCEDURE — 91300 PR COVID VAC PFIZER DIL RECON 30 MCG/0.3 ML IM: CPT

## 2021-10-24 ENCOUNTER — HEALTH MAINTENANCE LETTER (OUTPATIENT)
Age: 71
End: 2021-10-24

## 2021-12-13 ENCOUNTER — OFFICE VISIT (OUTPATIENT)
Dept: PEDIATRICS | Facility: OTHER | Age: 71
End: 2021-12-13
Attending: INTERNAL MEDICINE
Payer: COMMERCIAL

## 2021-12-13 ENCOUNTER — TELEPHONE (OUTPATIENT)
Dept: PEDIATRICS | Facility: OTHER | Age: 71
End: 2021-12-13

## 2021-12-13 VITALS
RESPIRATION RATE: 16 BRPM | TEMPERATURE: 97.5 F | WEIGHT: 231.2 LBS | HEART RATE: 102 BPM | HEIGHT: 74 IN | SYSTOLIC BLOOD PRESSURE: 130 MMHG | BODY MASS INDEX: 29.67 KG/M2 | DIASTOLIC BLOOD PRESSURE: 84 MMHG | OXYGEN SATURATION: 98 %

## 2021-12-13 DIAGNOSIS — E78.5 HYPERLIPIDEMIA, UNSPECIFIED HYPERLIPIDEMIA TYPE: ICD-10-CM

## 2021-12-13 DIAGNOSIS — I49.9 IRREGULAR HEART BEAT: ICD-10-CM

## 2021-12-13 DIAGNOSIS — I48.21 PERMANENT ATRIAL FIBRILLATION (H): ICD-10-CM

## 2021-12-13 DIAGNOSIS — R73.09 ELEVATED GLUCOSE: ICD-10-CM

## 2021-12-13 DIAGNOSIS — K21.9 GASTROESOPHAGEAL REFLUX DISEASE, UNSPECIFIED WHETHER ESOPHAGITIS PRESENT: ICD-10-CM

## 2021-12-13 DIAGNOSIS — I25.10 ATHEROSCLEROSIS OF NATIVE CORONARY ARTERY OF NATIVE HEART WITHOUT ANGINA PECTORIS: ICD-10-CM

## 2021-12-13 DIAGNOSIS — M79.2 NERVE PAIN: ICD-10-CM

## 2021-12-13 DIAGNOSIS — Z86.79 S/P ABLATION OF ATRIAL FIBRILLATION: ICD-10-CM

## 2021-12-13 DIAGNOSIS — Z13.0 SCREENING, ANEMIA, DEFICIENCY, IRON: ICD-10-CM

## 2021-12-13 DIAGNOSIS — I10 ESSENTIAL HYPERTENSION: Primary | ICD-10-CM

## 2021-12-13 DIAGNOSIS — Z98.890 S/P ABLATION OF ATRIAL FIBRILLATION: ICD-10-CM

## 2021-12-13 LAB
ANION GAP SERPL CALCULATED.3IONS-SCNC: 7 MMOL/L (ref 3–14)
ATRIAL RATE - MUSE: 138 BPM
BUN SERPL-MCNC: 17 MG/DL (ref 7–25)
CALCIUM SERPL-MCNC: 9.8 MG/DL (ref 8.6–10.3)
CHLORIDE BLD-SCNC: 99 MMOL/L (ref 98–107)
CHOLEST SERPL-MCNC: 133 MG/DL
CO2 SERPL-SCNC: 31 MMOL/L (ref 21–31)
CREAT SERPL-MCNC: 1.14 MG/DL (ref 0.7–1.3)
DIASTOLIC BLOOD PRESSURE - MUSE: NORMAL MMHG
ERYTHROCYTE [DISTWIDTH] IN BLOOD BY AUTOMATED COUNT: 12.6 % (ref 10–15)
FASTING STATUS PATIENT QL REPORTED: YES
GFR SERPL CREATININE-BSD FRML MDRD: 65 ML/MIN/1.73M2
GLUCOSE BLD-MCNC: 94 MG/DL (ref 70–105)
HBA1C MFR BLD: 5.9 % (ref 4–6.2)
HCT VFR BLD AUTO: 43.2 % (ref 40–53)
HDLC SERPL-MCNC: 46 MG/DL (ref 23–92)
HGB BLD-MCNC: 15 G/DL (ref 13.3–17.7)
INTERPRETATION ECG - MUSE: NORMAL
LDLC SERPL CALC-MCNC: 66 MG/DL
MCH RBC QN AUTO: 30.1 PG (ref 26.5–33)
MCHC RBC AUTO-ENTMCNC: 34.7 G/DL (ref 31.5–36.5)
MCV RBC AUTO: 87 FL (ref 78–100)
NONHDLC SERPL-MCNC: 87 MG/DL
P AXIS - MUSE: NORMAL DEGREES
PLATELET # BLD AUTO: 199 10E3/UL (ref 150–450)
POTASSIUM BLD-SCNC: 4.4 MMOL/L (ref 3.5–5.1)
PR INTERVAL - MUSE: NORMAL MS
QRS DURATION - MUSE: 98 MS
QT - MUSE: 364 MS
QTC - MUSE: 459 MS
R AXIS - MUSE: 36 DEGREES
RBC # BLD AUTO: 4.98 10E6/UL (ref 4.4–5.9)
SODIUM SERPL-SCNC: 137 MMOL/L (ref 134–144)
SYSTOLIC BLOOD PRESSURE - MUSE: NORMAL MMHG
T AXIS - MUSE: -30 DEGREES
TRIGL SERPL-MCNC: 103 MG/DL
VENTRICULAR RATE- MUSE: 96 BPM
WBC # BLD AUTO: 5.9 10E3/UL (ref 4–11)

## 2021-12-13 PROCEDURE — 80048 BASIC METABOLIC PNL TOTAL CA: CPT | Mod: ZL | Performed by: INTERNAL MEDICINE

## 2021-12-13 PROCEDURE — 80061 LIPID PANEL: CPT | Mod: ZL | Performed by: INTERNAL MEDICINE

## 2021-12-13 PROCEDURE — 36415 COLL VENOUS BLD VENIPUNCTURE: CPT | Mod: ZL | Performed by: INTERNAL MEDICINE

## 2021-12-13 PROCEDURE — 85014 HEMATOCRIT: CPT | Mod: ZL | Performed by: INTERNAL MEDICINE

## 2021-12-13 PROCEDURE — 83036 HEMOGLOBIN GLYCOSYLATED A1C: CPT | Mod: ZL | Performed by: INTERNAL MEDICINE

## 2021-12-13 PROCEDURE — 93005 ELECTROCARDIOGRAM TRACING: CPT | Performed by: INTERNAL MEDICINE

## 2021-12-13 PROCEDURE — 99214 OFFICE O/P EST MOD 30 MIN: CPT | Performed by: INTERNAL MEDICINE

## 2021-12-13 PROCEDURE — G0463 HOSPITAL OUTPT CLINIC VISIT: HCPCS

## 2021-12-13 PROCEDURE — 93010 ELECTROCARDIOGRAM REPORT: CPT | Performed by: INTERNAL MEDICINE

## 2021-12-13 RX ORDER — GABAPENTIN 300 MG/1
300 CAPSULE ORAL AT BEDTIME
Qty: 90 CAPSULE | Refills: 3 | Status: SHIPPED | OUTPATIENT
Start: 2021-12-13 | End: 2022-12-07

## 2021-12-13 RX ORDER — AMLODIPINE BESYLATE 5 MG/1
5 TABLET ORAL DAILY
Qty: 90 TABLET | Refills: 3 | Status: SHIPPED | OUTPATIENT
Start: 2021-12-13 | End: 2022-12-07

## 2021-12-13 RX ORDER — ATORVASTATIN CALCIUM 80 MG/1
80 TABLET, FILM COATED ORAL DAILY
Qty: 90 TABLET | Refills: 4 | Status: SHIPPED | OUTPATIENT
Start: 2021-12-13 | End: 2022-12-07

## 2021-12-13 RX ORDER — NITROGLYCERIN 0.4 MG/1
TABLET SUBLINGUAL
Qty: 25 TABLET | Refills: 1 | Status: SHIPPED | OUTPATIENT
Start: 2021-12-13

## 2021-12-13 RX ORDER — METOPROLOL TARTRATE 25 MG/1
25 TABLET, FILM COATED ORAL 2 TIMES DAILY
Qty: 180 TABLET | Refills: 3 | Status: SHIPPED | OUTPATIENT
Start: 2021-12-13 | End: 2021-12-15

## 2021-12-13 RX ORDER — GABAPENTIN 100 MG/1
100 CAPSULE ORAL DAILY
Qty: 90 CAPSULE | Refills: 3 | Status: SHIPPED | OUTPATIENT
Start: 2021-12-13 | End: 2022-12-07

## 2021-12-13 RX ORDER — LISINOPRIL 40 MG/1
40 TABLET ORAL DAILY
Qty: 90 TABLET | Refills: 4 | Status: SHIPPED | OUTPATIENT
Start: 2021-12-13 | End: 2022-12-07

## 2021-12-13 ASSESSMENT — PATIENT HEALTH QUESTIONNAIRE - PHQ9
SUM OF ALL RESPONSES TO PHQ QUESTIONS 1-9: 0
10. IF YOU CHECKED OFF ANY PROBLEMS, HOW DIFFICULT HAVE THESE PROBLEMS MADE IT FOR YOU TO DO YOUR WORK, TAKE CARE OF THINGS AT HOME, OR GET ALONG WITH OTHER PEOPLE: NOT DIFFICULT AT ALL
SUM OF ALL RESPONSES TO PHQ QUESTIONS 1-9: 0

## 2021-12-13 ASSESSMENT — MIFFLIN-ST. JEOR: SCORE: 1878.47

## 2021-12-13 ASSESSMENT — PAIN SCALES - GENERAL: PAINLEVEL: NO PAIN (0)

## 2021-12-13 NOTE — PROGRESS NOTES
"Subjective   Cedric Colin is a 71 year old male who presents for med management.  He has been feeling well, no concerns.  No exertional symptoms.  When asked about his irregular heart rate he says he has had A. fib status post 3 ablations.  He was on blood thinners in the past but does not know why they were discontinued.  [facility]    Objective   Vitals: /84 (BP Location: Right arm, Patient Position: Sitting, Cuff Size: Adult Regular)   Pulse 102   Temp 97.5  F (36.4  C) (Tympanic)   Resp 16   Ht 1.88 m (6' 2\")   Wt 104.9 kg (231 lb 3.2 oz)   SpO2 98%   BMI 29.68 kg/m      HEENT: No carotid bruits  Cardiovascular: Irregularly irregular, no murmur  Pulmonary: Clear  MSK no edema    Review and Analysis of Data   I personally reviewed the following:  External notes: Yes Most recent cardiology note is reviewed from care everywhere  Results: Yes Most recent lab work reviewed.  EKG from today reviewed with patient.  Use of an independent historian: No  Independent review of a test performed by another physician: No  Discussion of management with another physician: No  Moderate risk of morbidity from additional diagnostic testing and/or treatment.    Assessment & Plan   1. Essential hypertension  At goal, no change  - amLODIPine (NORVASC) 5 MG tablet; Take 1 tablet (5 mg) by mouth daily  Dispense: 90 tablet; Refill: 3  - lisinopril (ZESTRIL) 40 MG tablet; Take 1 tablet (40 mg) by mouth daily  Dispense: 90 tablet; Refill: 4  - Basic metabolic panel; Future  - Basic metabolic panel    2. Hyperlipidemia, unspecified hyperlipidemia type  At goal, no change.  - atorvastatin (LIPITOR) 80 MG tablet; Take 1 tablet (80 mg) by mouth daily  Dispense: 90 tablet; Refill: 4  - Lipid Profile; Future  - Lipid Profile    3. Nerve pain  At goal, no change.  - gabapentin (NEURONTIN) 100 MG capsule; Take 1 capsule (100 mg) by mouth daily  Dispense: 90 capsule; Refill: 3  - gabapentin (NEURONTIN) 300 " MG capsule; Take 1 capsule (300 mg) by mouth At Bedtime  Dispense: 90 capsule; Refill: 3    4. Gastroesophageal reflux disease, unspecified whether esophagitis present  At goal, no change.  - omeprazole (PRILOSEC) 20 MG DR capsule; Take 1 capsule (20 mg) by mouth daily  Dispense: 90 capsule; Refill: 4    5. Atherosclerosis of native coronary artery of native heart without angina pectoris  At goal, no change.  - nitroGLYcerin (NITROSTAT) 0.4 MG sublingual tablet; For chest pain place 1 tablet under the tongue every 5 minutes for 3 doses. If symptoms persist 5 minutes after 1st dose call 911.  Dispense: 25 tablet; Refill: 1  - Lipid Profile; Future  - Lipid Profile    6. Elevated glucose  - Hemoglobin A1c; Future  - Hemoglobin A1c    7. Screening, anemia, deficiency, iron  - CBC with platelets; Future  - CBC with platelets    8. Irregular heart beat  9. Permanent atrial fibrillation (H)  10. S/P ablation of atrial fibrillation  He is back in atrial fibrillation at this time.  We had a lengthy discussion with regards to atrial fibrillation including pathophysiology, expected clinical course, possible complications.  Treatment options were discussed.  I recommend initiating a beta-blocker and a blood thinner at this time.  Blood thinner options were discussed and we decided upon Eliquis.  Adverse effects were discussed.  Close follow-up is recommended.  - apixaban ANTICOAGULANT (ELIQUIS ANTICOAGULANT) 5 MG tablet; Take 1 tablet (5 mg) by mouth 2 times daily  Dispense: 180 tablet; Refill: 3  - metoprolol tartrate (LOPRESSOR) 25 MG tablet; Take 1 tablet (25 mg) by mouth 2 times daily  Dispense: 180 tablet; Refill: 3      Immunizations are up-to-date  Colon cancer screening up-to-date  Discussed prostate cancer screening deferred    Patient Instructions    -- Start Eliquis   -- Start metoprolol   -- Goal HR < 90   -- Follow-up in 1 month      Return in about 1 month (around 1/13/2022) for med management.    Signed, David  MD Adam, FAAP, FACP  Internal Medicine & Pediatrics

## 2021-12-13 NOTE — NURSING NOTE
"Patient presents to the clinic today for a annual medication check.  Elke Kaplan LPN 12/13/2021   11:32 AM    Chief Complaint   Patient presents with     Recheck Medication       Initial /84 (BP Location: Right arm, Patient Position: Sitting, Cuff Size: Adult Regular)   Pulse 102   Temp 97.5  F (36.4  C) (Tympanic)   Resp 16   Ht 1.88 m (6' 2\")   Wt 104.9 kg (231 lb 3.2 oz)   SpO2 98%   BMI 29.68 kg/m   Estimated body mass index is 29.68 kg/m  as calculated from the following:    Height as of this encounter: 1.88 m (6' 2\").    Weight as of this encounter: 104.9 kg (231 lb 3.2 oz).  Medication Reconciliation: complete  Elke Kaplan LPN      FOOD SECURITY SCREENING QUESTIONS:    The next two questions are to help us understand your food security.  If you are feeling you need any assistance in this area, we have resources available to support you today.    Hunger Vital Signs:  Within the past 12 months we worried whether our food would run out before we got money to buy more. Never  Within the past 12 months the food we bought just didn't last and we didn't have money to get more. Never  Elke Kaplan LPN,LPN on 12/13/2021 at 11:32 AM  "

## 2021-12-13 NOTE — TELEPHONE ENCOUNTER
Shun stopped by [address] after his appointment and after checking for his medications at the pharmacy. He said they were a little too pricey and was wondering if she was able to get either a written script or a [facility] so [facility] could [facility] for [facility].      Rosaura Saleem on 12/13/2021 at 12:50 PM   Handoff

## 2021-12-14 ASSESSMENT — PATIENT HEALTH QUESTIONNAIRE - PHQ9: SUM OF ALL RESPONSES TO PHQ QUESTIONS 1-9: 0

## 2021-12-15 NOTE — TELEPHONE ENCOUNTER
Left message to call back and let us know what medications he needs written scripts for or where to send them.  Radha Saleem LPN  12/15/2021 12:00 PM

## 2021-12-15 NOTE — TELEPHONE ENCOUNTER
Patient could not afford his new prescriptions for Metoprolol and Eliquis at our pharmacy.  He would like to have the prescriptions printed out so he can [facility] [place].  He will pick them at the [address] [facility] on Friday.  Radha Saleem LPN  12/15/2021 3:39 PM

## 2021-12-16 RX ORDER — METOPROLOL TARTRATE 25 MG/1
25 TABLET, FILM COATED ORAL 2 TIMES DAILY
Qty: 180 TABLET | Refills: 3 | Status: SHIPPED | OUTPATIENT
Start: 2021-12-16 | End: 2022-02-02 | Stop reason: ALTCHOICE

## 2021-12-16 NOTE — TELEPHONE ENCOUNTER
Patient notified that requested prescriptions were printed.  This writer offered to mail [facility] to the [facility] and patient declined, patient prefers to pick this up tomorrow at [address] [facility].      Ethel Alfonso LPN 12/16/2021 9:37 AM

## 2021-12-20 ENCOUNTER — TELEPHONE (OUTPATIENT)
Dept: PEDIATRICS | Facility: OTHER | Age: 71
End: 2021-12-20
Payer: COMMERCIAL

## 2021-12-20 NOTE — TELEPHONE ENCOUNTER
The patient called and needs prescription of Eliquis and metoprolol [facility] to [facility]   [phone]

## 2022-01-27 ENCOUNTER — MYC MEDICAL ADVICE (OUTPATIENT)
Dept: INTERNAL MEDICINE | Facility: OTHER | Age: 72
End: 2022-01-27
Payer: COMMERCIAL

## 2022-02-02 ENCOUNTER — VIRTUAL VISIT (OUTPATIENT)
Dept: INTERNAL MEDICINE | Facility: OTHER | Age: 72
End: 2022-02-02
Attending: INTERNAL MEDICINE
Payer: COMMERCIAL

## 2022-02-02 DIAGNOSIS — I49.9 IRREGULAR HEART BEAT: ICD-10-CM

## 2022-02-02 DIAGNOSIS — I70.0 ATHEROSCLEROSIS OF AORTA (H): ICD-10-CM

## 2022-02-02 DIAGNOSIS — Z98.890 S/P ABLATION OF ATRIAL FIBRILLATION: ICD-10-CM

## 2022-02-02 DIAGNOSIS — Z86.79 S/P ABLATION OF ATRIAL FIBRILLATION: ICD-10-CM

## 2022-02-02 DIAGNOSIS — I48.21 PERMANENT ATRIAL FIBRILLATION (H): ICD-10-CM

## 2022-02-02 PROCEDURE — 99213 OFFICE O/P EST LOW 20 MIN: CPT | Mod: TEL | Performed by: INTERNAL MEDICINE

## 2022-02-02 RX ORDER — METOPROLOL SUCCINATE 25 MG/1
25 TABLET, EXTENDED RELEASE ORAL DAILY
Qty: 90 TABLET | Refills: 3 | Status: SHIPPED | OUTPATIENT
Start: 2022-02-02 | End: 2022-12-07

## 2022-02-02 NOTE — PROGRESS NOTES
"Shun is a 71 year old who is being evaluated via a billable telephone visit.      What phone number would you like to be contacted at? [phone]  How would you like to obtain your AVS? MyChart    Assessment & Plan     Atherosclerosis of aorta (H)  - on asa, statin, ACEI/ARB, beta blocker  - no symptoms at this time; continue meds as prescribed    Permanent atrial fibrillation (H)  At this time given his difficulty taking twice daily medications we will send in Xarelto and metoprolol.  He is to monitor for side effects including bleeding.  He is cautioned with any injury or accident to be cautious and be seen if needed.  [facility]  - metoprolol succinate ER (TOPROL-XL) 25 MG 24 hr tablet; Take 1 tablet (25 mg) by mouth daily  - rivaroxaban ANTICOAGULANT (XARELTO) 20 MG TABS tablet; Take 1 tablet (20 mg) by mouth daily (with dinner)    Irregular heart beat    S/P ablation of atrial fibrillation    BMI:   Estimated body mass index is 29.68 kg/m  as calculated from the following:    Height as of 12/13/21: 1.88 m (6' 2\").    Weight as of 12/13/21: 104.9 kg (231 lb 3.2 oz).     Follow-up next December for annual physical and sooner as needed.    Kanika Gu, [facility]    Subjective   Shun is a 71 year old who presents for the following health issues:    He has a history of atrial fibrillation status post ablation.  He was recently seen and it was recommended that he be started on metoprolol and Xarelto.  Due to some confusion in prescriptions and concern regarding these medications, he has not started these yet.    He is curious if metoprolol and Eliquis are the right options for him and if so [facility]    He requests a handicap permit today as his is expiring.    Review of Systems   Denies any fevers, chills.      Objective           Vitals:  No vitals were obtained today due to virtual visit.    Physical Exam   healthy, alert and " no distress  PSYCH: Alert and oriented times 3; coherent speech, normal   rate and volume, able to articulate logical thoughts, able   to abstract reason, no tangential thoughts, no hallucinations   or delusions  His affect is normal  RESP: No cough, no audible wheezing, able to talk in full sentences  Remainder of exam unable to be completed due to telephone visits        Phone call duration: 15 minutes

## 2022-02-02 NOTE — TELEPHONE ENCOUNTER
Tried calling back and got voicemail again.  Siobhan Gramajo CMA([facility])..................2/2/2022   10:20 AM

## 2022-02-02 NOTE — NURSING NOTE
Patient requests telephone visit today for follow up from visit with Dr Medina and the medications he has prescribed. He has not started the Eliquis or Metoprolol.  Medication list reviewed.  Siobhan Gramajo CMA([facility])..................2/2/2022   10:28 AM

## 2022-02-04 ENCOUNTER — TELEPHONE (OUTPATIENT)
Dept: INTERNAL MEDICINE | Facility: OTHER | Age: 72
End: 2022-02-04
Payer: COMMERCIAL

## 2022-02-04 NOTE — TELEPHONE ENCOUNTER
Left message to call back.  Siobhan Gramajo CMA([facility]) ....................  2/4/2022   9:54 AM

## 2022-02-04 NOTE — TELEPHONE ENCOUNTER
Questions about going to once daily dosing on his Metoprolol and also the change from Eliquis to Xarelto - to go with once daily dosing. Per visit note from Dr Gu on 2/2/22:    Assessment & Plan:    Permanent atrial fibrillation (H)  At this time given his difficulty taking twice daily medications we will send in Xarelto and metoprolol.  He is to monitor for side effects including bleeding.  He is cautioned with any injury or accident to be cautious and be seen if needed.  [facility]  - metoprolol succinate ER (TOPROL-XL) 25 MG 24 hr tablet; Take 1 tablet (25 mg) by mouth daily  - rivaroxaban ANTICOAGULANT (XARELTO) 20 MG TABS tablet; Take 1 tablet (20 mg) by mouth daily (with dinner)     Kanika Gu,   [facility]      Will fax this phone call note to them until the visit note is complete (then they may be able to access the full office visit record).  Siobhan Gramajo CMA([facility])..................2/4/2022   10:08 AM

## 2022-02-07 ENCOUNTER — TELEPHONE (OUTPATIENT)
Dept: INTERNAL MEDICINE | Facility: OTHER | Age: 72
End: 2022-02-07
Payer: COMMERCIAL

## 2022-02-13 ENCOUNTER — HEALTH MAINTENANCE LETTER (OUTPATIENT)
Age: 72
End: 2022-02-13

## 2022-05-09 ENCOUNTER — MYC MEDICAL ADVICE (OUTPATIENT)
Dept: INTERNAL MEDICINE | Facility: OTHER | Age: 72
End: 2022-05-09
Payer: COMMERCIAL

## 2022-09-22 ENCOUNTER — TELEPHONE (OUTPATIENT)
Dept: INTERNAL MEDICINE | Facility: OTHER | Age: 72
End: 2022-09-22

## 2022-09-22 NOTE — TELEPHONE ENCOUNTER
Patient tested positive for covid on Tuesday 9/20/22. Patient would like to speak to [facility] or the nurse about he should do next.     Alaina Mcclain on 9/22/2022 at 8:03 AM

## 2022-09-22 NOTE — TELEPHONE ENCOUNTER
He should monitor his symptoms, use Tylenol as needed for aches/fevers and if he is interested in antiviral meds, set up an appt with any available provider/RC for these

## 2022-09-22 NOTE — TELEPHONE ENCOUNTER
Patient tested positive 9/20/22. Patient is experiencing a scratchy throat (which was resolved), and slight temp (100.5), which was resolved, and a cough productive cough, and chills. Patient states that he has taken tylenol for symptoms.  Patient wondering if he needs to do anything else because of his age. Overall patient states he does not feel to bad. Message sent to MD to advise Radha Devine LPN on 9/22/2022 at 11:31 AM

## 2022-09-23 NOTE — TELEPHONE ENCOUNTER
Spoke to patient. States symptoms have improved with no new concerns. Denies need for antiviral meds. Instructed to call back with any concerns.     Corinne R Thayer, RN on 9/23/2022 at 9:36 AM

## 2022-10-15 ENCOUNTER — HEALTH MAINTENANCE LETTER (OUTPATIENT)
Age: 72
End: 2022-10-15

## 2022-12-07 ENCOUNTER — OFFICE VISIT (OUTPATIENT)
Dept: INTERNAL MEDICINE | Facility: OTHER | Age: 72
End: 2022-12-07
Attending: INTERNAL MEDICINE
Payer: COMMERCIAL

## 2022-12-07 VITALS
HEART RATE: 96 BPM | BODY MASS INDEX: 30.48 KG/M2 | RESPIRATION RATE: 16 BRPM | OXYGEN SATURATION: 98 % | HEIGHT: 73 IN | TEMPERATURE: 97 F | SYSTOLIC BLOOD PRESSURE: 116 MMHG | WEIGHT: 230 LBS | DIASTOLIC BLOOD PRESSURE: 78 MMHG

## 2022-12-07 DIAGNOSIS — I48.21 PERMANENT ATRIAL FIBRILLATION (H): ICD-10-CM

## 2022-12-07 DIAGNOSIS — E78.5 HYPERLIPIDEMIA, UNSPECIFIED HYPERLIPIDEMIA TYPE: ICD-10-CM

## 2022-12-07 DIAGNOSIS — Z00.00 ENCOUNTER FOR MEDICARE ANNUAL WELLNESS EXAM: ICD-10-CM

## 2022-12-07 DIAGNOSIS — Z13.1 SCREENING FOR DIABETES MELLITUS: Primary | ICD-10-CM

## 2022-12-07 DIAGNOSIS — M79.2 NERVE PAIN: ICD-10-CM

## 2022-12-07 DIAGNOSIS — K21.9 GASTROESOPHAGEAL REFLUX DISEASE, UNSPECIFIED WHETHER ESOPHAGITIS PRESENT: ICD-10-CM

## 2022-12-07 DIAGNOSIS — I10 ESSENTIAL HYPERTENSION: ICD-10-CM

## 2022-12-07 LAB
ALBUMIN SERPL BCG-MCNC: 4.5 G/DL (ref 3.5–5.2)
ALP SERPL-CCNC: 112 U/L (ref 40–129)
ALT SERPL W P-5'-P-CCNC: 24 U/L (ref 10–50)
ANION GAP SERPL CALCULATED.3IONS-SCNC: 4 MMOL/L (ref 7–15)
AST SERPL W P-5'-P-CCNC: 24 U/L (ref 10–50)
BILIRUB SERPL-MCNC: 0.8 MG/DL
BUN SERPL-MCNC: 20.8 MG/DL (ref 8–23)
CALCIUM SERPL-MCNC: 10.2 MG/DL (ref 8.8–10.2)
CHLORIDE SERPL-SCNC: 103 MMOL/L (ref 98–107)
CHOLEST SERPL-MCNC: 169 MG/DL
CREAT SERPL-MCNC: 1.14 MG/DL (ref 0.67–1.17)
DEPRECATED HCO3 PLAS-SCNC: 33 MMOL/L (ref 22–29)
ERYTHROCYTE [DISTWIDTH] IN BLOOD BY AUTOMATED COUNT: 13.4 % (ref 10–15)
GFR SERPL CREATININE-BSD FRML MDRD: 69 ML/MIN/1.73M2
GLUCOSE SERPL-MCNC: 128 MG/DL (ref 70–99)
HCT VFR BLD AUTO: 49.1 % (ref 40–53)
HDLC SERPL-MCNC: 54 MG/DL
HGB BLD-MCNC: 16.7 G/DL (ref 13.3–17.7)
LDLC SERPL CALC-MCNC: 93 MG/DL
MAGNESIUM SERPL-MCNC: 1.9 MG/DL (ref 1.7–2.3)
MCH RBC QN AUTO: 29.4 PG (ref 26.5–33)
MCHC RBC AUTO-ENTMCNC: 34 G/DL (ref 31.5–36.5)
MCV RBC AUTO: 86 FL (ref 78–100)
NONHDLC SERPL-MCNC: 115 MG/DL
PLATELET # BLD AUTO: 191 10E3/UL (ref 150–450)
POTASSIUM SERPL-SCNC: 4.5 MMOL/L (ref 3.4–5.3)
PROT SERPL-MCNC: 8.2 G/DL (ref 6.4–8.3)
RBC # BLD AUTO: 5.68 10E6/UL (ref 4.4–5.9)
SODIUM SERPL-SCNC: 140 MMOL/L (ref 136–145)
TRIGL SERPL-MCNC: 109 MG/DL
WBC # BLD AUTO: 6.2 10E3/UL (ref 4–11)

## 2022-12-07 PROCEDURE — 80061 LIPID PANEL: CPT | Mod: ZL | Performed by: INTERNAL MEDICINE

## 2022-12-07 PROCEDURE — 99213 OFFICE O/P EST LOW 20 MIN: CPT | Mod: 25 | Performed by: INTERNAL MEDICINE

## 2022-12-07 PROCEDURE — 36415 COLL VENOUS BLD VENIPUNCTURE: CPT | Mod: ZL | Performed by: INTERNAL MEDICINE

## 2022-12-07 PROCEDURE — G0439 PPPS, SUBSEQ VISIT: HCPCS | Performed by: INTERNAL MEDICINE

## 2022-12-07 PROCEDURE — 80053 COMPREHEN METABOLIC PANEL: CPT | Mod: ZL | Performed by: INTERNAL MEDICINE

## 2022-12-07 PROCEDURE — G0463 HOSPITAL OUTPT CLINIC VISIT: HCPCS

## 2022-12-07 PROCEDURE — 85027 COMPLETE CBC AUTOMATED: CPT | Mod: ZL | Performed by: INTERNAL MEDICINE

## 2022-12-07 PROCEDURE — 83735 ASSAY OF MAGNESIUM: CPT | Mod: ZL | Performed by: INTERNAL MEDICINE

## 2022-12-07 RX ORDER — GABAPENTIN 300 MG/1
300 CAPSULE ORAL AT BEDTIME
Qty: 90 CAPSULE | Refills: 4 | Status: SHIPPED | OUTPATIENT
Start: 2022-12-07

## 2022-12-07 RX ORDER — AMLODIPINE BESYLATE 5 MG/1
5 TABLET ORAL DAILY
Qty: 90 TABLET | Refills: 4 | Status: SHIPPED | OUTPATIENT
Start: 2022-12-07

## 2022-12-07 RX ORDER — METOPROLOL SUCCINATE 25 MG/1
25 TABLET, EXTENDED RELEASE ORAL DAILY
Qty: 90 TABLET | Refills: 4 | Status: SHIPPED | OUTPATIENT
Start: 2022-12-07

## 2022-12-07 RX ORDER — ATORVASTATIN CALCIUM 80 MG/1
80 TABLET, FILM COATED ORAL DAILY
Qty: 90 TABLET | Refills: 4 | Status: SHIPPED | OUTPATIENT
Start: 2022-12-07

## 2022-12-07 RX ORDER — GABAPENTIN 100 MG/1
100 CAPSULE ORAL DAILY
Qty: 90 CAPSULE | Refills: 4 | Status: SHIPPED | OUTPATIENT
Start: 2022-12-07

## 2022-12-07 RX ORDER — LISINOPRIL 40 MG/1
40 TABLET ORAL DAILY
Qty: 90 TABLET | Refills: 4 | Status: SHIPPED | OUTPATIENT
Start: 2022-12-07

## 2022-12-07 ASSESSMENT — ENCOUNTER SYMPTOMS
HEARTBURN: 0
MYALGIAS: 0
NAUSEA: 0
PARESTHESIAS: 0
SHORTNESS OF BREATH: 0
WEAKNESS: 0
ARTHRALGIAS: 0
HEMATURIA: 0
COUGH: 0
ABDOMINAL PAIN: 0
JOINT SWELLING: 0
FREQUENCY: 0
CHILLS: 0
DIZZINESS: 0
EYE PAIN: 0
PALPITATIONS: 0
DYSURIA: 0
CONSTIPATION: 0
HEADACHES: 0
HEMATOCHEZIA: 0
DIARRHEA: 0

## 2022-12-07 ASSESSMENT — ACTIVITIES OF DAILY LIVING (ADL): CURRENT_FUNCTION: NO ASSISTANCE NEEDED

## 2022-12-07 ASSESSMENT — PAIN SCALES - GENERAL: PAINLEVEL: NO PAIN (0)

## 2022-12-07 NOTE — PATIENT INSTRUCTIONS
Sumaya Sutherland DO -  Internal Medicine    Tyler Dunphy, MD - Internal Medicine    Contact one of these doctors to set up primary care.  Internal Medicine - [facility]  - phone number [phone]    Check your dates of your shingles shots and send them to me or call with them if you can.  Patient Education   Personalized Prevention Plan  You are due for the preventive services outlined below.  Your care team is available to assist you in scheduling these services.  If you have already completed any of these items, please share that information with your care team to update in your medical record.  Health Maintenance Due   Topic Date Due     Zoster (Shingles) Vaccine (2 of 2) 01/30/2020

## 2022-12-07 NOTE — NURSING NOTE
Patient presents to clinic today for annual Medicare wellness visit.  Medication review completed.    ACP on file? No, form previously provided.     FOOD SECURITY SCREENING QUESTIONS    The next two questions are to help us understand your food security.  If you are feeling you need any assistance in this area, we have resources available to support you today.    Hunger Vital Signs:   Within the past 12 months we worried whether our food would run out before we got money to buy more. Never  Within the past 12 months the food we bought just didn't last and we didn't have money to get more. Never    Siobhan Gramajo CMA([facility])..................12/7/2022   1:49 PM

## 2022-12-07 NOTE — PROGRESS NOTES
"SUBJECTIVE:   Shun is a 72 year old who presents for Preventive Visit.  Patient has been advised of split billing requirements and indicates understanding: Yes  Are you in the first 12 months of your Medicare coverage?  No    He overall is feeling well.  His wife and him recently moved and are in an independent living apartment.  He is needing to switch doctors and curious about some ideas on this.    He does need renewals on his medications.  He has a history of hypertension and atrial fibrillation and continues on amlodipine and lisinopril along with metoprolol.  He is on rivaroxaban.  He denies any side effects with any of these medications.    He is on gabapentin for history of back pain with neuropathy.  He denies any side effects.  He continues on atorvastatin for his cholesterol    He continues on atorvastatin for his cholesterol and history of coronary disease.    He does [facility] that [facility]  He is up-to-date on his vaccines otherwise.  He is up-to-date on his cancer screening.    Healthy Habits:     In general, how would you rate your overall health?  Good    Frequency of exercise:  6-7 days/week    Duration of exercise:  15-30 minutes    Do you usually eat at least 4 servings of fruit and vegetables a day, include whole grains    & fiber and avoid regularly eating high fat or \"junk\" foods?  Yes    Taking medications regularly:  Yes    Medication side effects:  None    Ability to successfully perform activities of daily living:  No assistance needed    Home Safety:  No safety concerns identified    Hearing Impairment:  No hearing concerns    In the past 6 months, have you been bothered by leaking of urine?  No    In general, how would you rate your overall mental or emotional health?  Good      PHQ-2 Total Score: 0    Additional concerns today:  No      Have you ever done Advance Care Planning? (For example, a Health Directive, POLST, or a discussion with a medical provider " or your loved ones about your wishes): No, advance care planning information given to patient to review.  Advanced care planning was discussed at today's visit.       Fall risk  Fallen 2 or more times in the past year?: No  Any fall with injury in the past year?: No    Cognitive Screening   1) Repeat 3 items (Captain, Garden, Picture)    2) Clock draw: NORMAL   3) 3 item recall: Recalls 3 objects  Results: 3 items recalled: COGNITIVE IMPAIRMENT LESS LIKELY    Mini-CogTM Copyright ROBERTO Schwartz. Licensed by the author for use in [facility] [employer]; reprinted with permission (abhishek@[facility]). All rights reserved.      Do you have sleep apnea, excessive snoring or daytime drowsiness?: no      Regular dental visits: annually    Eye exam with in 2 years: no, needs to find a new eye doctor at new location.     Reviewed and updated as needed this visit by clinical staff   Tobacco  Allergies  Meds  Problems  Med Hx  Surg Hx  Fam Hx          Reviewed and updated as needed this visit by Provider   Tobacco  Allergies  Meds  Problems  Med Hx  Surg Hx  Fam Hx         Social History     Tobacco Use     Smoking status: Former     Packs/day: 1.50     Years: 10.00     Pack years: 15.00     Types: Cigarettes     Quit date: 1980     Years since quittin.9     Smokeless tobacco: Never   Substance Use Topics     Alcohol use: Not Currently     Comment: Alcoholic Drinks/day: rarely         Alcohol Use 2022   Prescreen: >3 drinks/day or >7 drinks/week? No   Prescreen: >3 drinks/day or >7 drinks/week? -     Review current opioid prescriptions    For a patient with a current opioid prescription:    Reviewed potential Opioid Use Disorder (OUD) risk factors: Yes     Evaluate their pain severity and current treatment plan:     Provide information on non-opioid treatment options:    Refer to a specialist, as appropriate:    Get more information on pain management in the [facility] Pain Management Best Practices Inter-agency  Task Force Report    Screen for potential Substance Use Disorders (SUDs)    Reviewed the patient s potential risk factors for SUDs: Yes     Refer to treatment or specialist, as appropriate:     A screening tool isn t required but you may use one:  Find more information in the National [place] on Drug Abuse Screening and Assessment Tools Chart    Current providers sharing in care for this patient include:   Patient Care Team:  Kanika Gu DO as PCP - Zoë Floyd NP as Nurse Practitioner (Internal Medicine)  Kanika Gu DO as Assigned PCP    The following health maintenance items are reviewed in Epic and correct as of today:  Health Maintenance   Topic Date Due     ZOSTER IMMUNIZATION (2 of 2) 01/30/2020     MEDICARE ANNUAL WELLNESS VISIT  12/03/2021     COLORECTAL CANCER SCREENING  05/22/2023     FALL RISK ASSESSMENT  12/07/2023     DTAP/TDAP/TD IMMUNIZATION (4 - Td or Tdap) 08/06/2025     LIPID  12/13/2026     ADVANCE CARE PLANNING  12/07/2027     HEPATITIS C SCREENING  Completed     PHQ-2 (once per calendar year)  Completed     INFLUENZA VACCINE  Completed     Pneumococcal Vaccine: 65+ Years  Completed     AORTIC ANEURYSM SCREENING (SYSTEM ASSIGNED)  Completed     COVID-19 Vaccine  Completed     IPV IMMUNIZATION  Aged Out     MENINGITIS IMMUNIZATION  Aged Out     Current Outpatient Medications   Medication     amLODIPine (NORVASC) 5 MG tablet     atorvastatin (LIPITOR) 80 MG tablet     cholecalciferol 50 MCG (2000 UT) tablet     gabapentin (NEURONTIN) 100 MG capsule     gabapentin (NEURONTIN) 300 MG capsule     lisinopril (ZESTRIL) 40 MG tablet     metoprolol succinate ER (TOPROL-XL) 25 MG 24 hr tablet     omeprazole (PRILOSEC) 20 MG DR capsule     rivaroxaban ANTICOAGULANT (XARELTO) 20 MG TABS tablet     nitroGLYcerin (NITROSTAT) 0.4 MG sublingual tablet     No current facility-administered medications for this visit.       Review of Systems   Constitutional: Negative for chills.   HENT:  "Negative for congestion, ear pain and hearing loss.    Eyes: Negative for pain and visual disturbance.   Respiratory: Negative for cough and shortness of breath.    Cardiovascular: Negative for chest pain, palpitations and peripheral edema.   Gastrointestinal: Negative for abdominal pain, constipation, diarrhea, heartburn, hematochezia and nausea.   Genitourinary: Negative for dysuria, frequency, genital sores, hematuria, impotence, penile discharge and urgency.   Musculoskeletal: Negative for arthralgias, joint swelling and myalgias.   Skin: Negative for rash.   Neurological: Negative for dizziness, weakness, headaches and paresthesias.   Psychiatric/Behavioral: Negative for mood changes.       OBJECTIVE:   /78 (BP Location: Right arm, Patient Position: Sitting, Cuff Size: Adult Regular)   Pulse 96   Temp 97  F (36.1  C) (Temporal)   Resp 16   Ht 1.861 m (6' 1.25\")   Wt 104.3 kg (230 lb)   SpO2 98%   BMI 30.14 kg/m   Estimated body mass index is 30.14 kg/m  as calculated from the following:    Height as of this encounter: 1.861 m (6' 1.25\").    Weight as of this encounter: 104.3 kg (230 lb).  Physical Exam  GEN: Vitals reviewed. Healthy appearing. Patient is in no acute distress. Cooperative with exam.  HEENT: Normocephalic atraumatic.  Eyes grossly normal to inspection.  No discharge or erythema, or obvious scleral/conjunctival abnormalities.   NECK: Supple; no thyromegaly or masses noted.  No cervical or supraclavicular lymphadenopathy.  CV: Heart regular in rate and rhythm with no murmur.    LUNGS: No audible wheeze, cough, or visible cyanosis.  No visible retractions or increased work of breathing.  Lungs clear to auscultation bilaterally.    ABD:  Nondistended  SKIN: Warm and dry to touch.  Visible skin clear. No significant rash, abnormal pigmentation or lesions.  EXT: No clubbing or cyanosis.  No peripheral edema.  NEURO: Alert and oriented to person, place, and time.  Cranial nerves II-XII " grossly intact with no focal or lateralizing deficits.  Muscle tone normal.  Gait normal. No tremor.   MSK: ROM of upper and lower ext symmetric and full.  PSYCH: Mood is good.  Mentation appears normal, affect normal/bright, judgement and insight intact, normal speech and appearance well-groomed.    Diagnostic Test Results:  Labs reviewed in Epic    ASSESSMENT / PLAN:   1. Essential hypertension  - Controlled.  Continue current regimen.    - amLODIPine (NORVASC) 5 MG tablet; Take 1 tablet (5 mg) by mouth daily  Dispense: 90 tablet; Refill: 4  - lisinopril (ZESTRIL) 40 MG tablet; Take 1 tablet (40 mg) by mouth daily  Dispense: 90 tablet; Refill: 4  - Magnesium    2. Hyperlipidemia, unspecified hyperlipidemia type  - Controlled.  Continue current regimen.    - atorvastatin (LIPITOR) 80 MG tablet; Take 1 tablet (80 mg) by mouth daily  Dispense: 90 tablet; Refill: 4  - Lipid Profile    3. Nerve pain  - Controlled.  Continue current regimen.    - gabapentin (NEURONTIN) 100 MG capsule; Take 1 capsule (100 mg) by mouth daily  Dispense: 90 capsule; Refill: 4  - gabapentin (NEURONTIN) 300 MG capsule; Take 1 capsule (300 mg) by mouth At Bedtime  Dispense: 90 capsule; Refill: 4    4. Permanent atrial fibrillation (H)  - Controlled.  Continue current regimen.    - metoprolol succinate ER (TOPROL XL) 25 MG 24 hr tablet; Take 1 tablet (25 mg) by mouth daily  Dispense: 90 tablet; Refill: 4  - rivaroxaban ANTICOAGULANT (XARELTO) 20 MG TABS tablet; Take 1 tablet (20 mg) by mouth daily (with dinner)  Dispense: 90 tablet; Refill: 4  - CBC with platelets    5. Gastroesophageal reflux disease, unspecified whether esophagitis present  - Controlled.  Continue current regimen.    - omeprazole (PRILOSEC) 20 MG DR capsule; Take 1 capsule (20 mg) by mouth daily  Dispense: 90 capsule; Refill: 4    6. Screening for diabetes mellitus  - Comprehensive metabolic panel    7. Encounter for Medicare annual wellness exam    COUNSELING:  Reviewed  "preventive health counseling, as reflected in patient instructions      BMI:   Estimated body mass index is 30.14 kg/m  as calculated from the following:    Height as of this encounter: 1.861 m (6' 1.25\").    Weight as of this encounter: 104.3 kg (230 lb).     He reports that he quit smoking about 42 years ago. His smoking use included cigarettes. He has a 15.00 pack-year smoking history. He has never used smokeless tobacco.    Appropriate preventive services were discussed with this patient, including applicable screening as appropriate for cardiovascular disease, diabetes, osteopenia/osteoporosis, and glaucoma.  As appropriate for age/gender, discussed screening for colorectal cancer, prostate cancer, breast cancer, and cervical cancer. Checklist reviewing preventive services available has been given to the patient.    Reviewed patients plan of care and provided an AVS. The Basic Care Plan (routine screening as documented in Health Maintenance) for Cedric meets the Care Plan requirement. This Care Plan has been established and reviewed with the Patient.    Kanika Gu,   [facility]    Identified Health Risks:  "

## 2024-01-07 ENCOUNTER — HEALTH MAINTENANCE LETTER (OUTPATIENT)
Age: 74
End: 2024-01-07

## 2024-03-27 ENCOUNTER — PATIENT OUTREACH (OUTPATIENT)
Dept: INTERNAL MEDICINE | Facility: OTHER | Age: 74
End: 2024-03-27
Payer: COMMERCIAL

## 2024-03-27 NOTE — LETTER
[facility]  [address]  [place] [id number] [phone]       March 27, 2024    Cedric Colin  [address]  [facility] [address]    Dear Shun,    We care about your health and have reviewed your health plan and are making recommendations based on this review, to optimize your health.    You are in particular need of attention regarding:  -Wellness (Physical) Visit     We are recommending that you:  -schedule a WELLNESS (Physical) APPOINTMENT with me.   I will check fasting labs the same day - nothing to eat except water and meds for 8-10 hours prior. (If you go elsewhere for Wellness visits then please disregard this reminder.)    In addition, here is a list of due or overdue Health Maintenance reminders.    Health Maintenance Due   Topic Date Due    LUNG CANCER SCREENING  Never done    RSV VACCINE (Pregnancy & 60+) (1 - 1-dose 60+ series) Never done    Zoster (Shingles) Vaccine (2 of 2) 01/30/2020    Colorectal Cancer Screening  05/22/2023    Flu Vaccine (1) 09/01/2023    COVID-19 Vaccine (6 - 2023-24 season) 09/01/2023    Annual Wellness Visit  12/07/2023    Cholesterol Lab  12/07/2023    FALL RISK ASSESSMENT  12/07/2023    PHQ-2 (once per calendar year)  01/01/2024       To address the above recommendations, we encourage you to contact us at [phone].They will assist you with finding the most convenient time and location.    Thank you for trusting [facility] and we appreciate the opportunity to serve you.  We look forward to supporting your healthcare needs in the future.    Healthy Regards,    Your [facility] Team

## 2024-03-27 NOTE — TELEPHONE ENCOUNTER
Patient Quality Outreach    Patient is due for the following:   Physical Annual Wellness Visit    Next Steps:   Schedule a Annual Wellness Visit    Type of outreach:    Sent letter.      Questions for provider review:    None           Nicky Rainey

## 2025-01-25 ENCOUNTER — HEALTH MAINTENANCE LETTER (OUTPATIENT)
Age: 75
End: 2025-01-25

## (undated) RX ORDER — REGADENOSON 0.08 MG/ML
INJECTION, SOLUTION INTRAVENOUS
Status: DISPENSED
Start: 2020-01-15